# Patient Record
Sex: MALE | Race: BLACK OR AFRICAN AMERICAN | NOT HISPANIC OR LATINO | Employment: UNEMPLOYED | URBAN - METROPOLITAN AREA
[De-identification: names, ages, dates, MRNs, and addresses within clinical notes are randomized per-mention and may not be internally consistent; named-entity substitution may affect disease eponyms.]

---

## 2024-01-11 ENCOUNTER — APPOINTMENT (OUTPATIENT)
Dept: HEMATOLOGY/ONCOLOGY | Facility: HOSPITAL | Age: 64
End: 2024-01-11

## 2024-01-18 ENCOUNTER — APPOINTMENT (OUTPATIENT)
Dept: HEMATOLOGY/ONCOLOGY | Facility: HOSPITAL | Age: 64
End: 2024-01-18
Payer: MEDICAID

## 2024-01-25 ENCOUNTER — TELEPHONE (OUTPATIENT)
Dept: HEMATOLOGY/ONCOLOGY | Facility: HOSPITAL | Age: 64
End: 2024-01-25
Payer: MEDICAID

## 2024-01-25 ENCOUNTER — APPOINTMENT (OUTPATIENT)
Dept: HEMATOLOGY/ONCOLOGY | Facility: HOSPITAL | Age: 64
End: 2024-01-25
Payer: MEDICAID

## 2024-01-25 DIAGNOSIS — C61 PROSTATE CANCER (MULTI): Primary | ICD-10-CM

## 2024-01-25 NOTE — TELEPHONE ENCOUNTER
Called patient for our scheduling phone appt.   He had told our nurse partner he forgot about it/wasn't aware.   He did not get his PSA drawn.   He is agreeable to do it today or tomorrow at .   Order for PSA placed and order to get him rescheduled for a phone visit was placed.

## 2024-02-01 ENCOUNTER — TELEMEDICINE (OUTPATIENT)
Dept: HEMATOLOGY/ONCOLOGY | Facility: HOSPITAL | Age: 64
End: 2024-02-01
Payer: MEDICAID

## 2024-02-01 DIAGNOSIS — C61 PROSTATE CANCER (MULTI): ICD-10-CM

## 2024-02-05 ENCOUNTER — LAB (OUTPATIENT)
Dept: LAB | Facility: HOSPITAL | Age: 64
End: 2024-02-05
Payer: MEDICAID

## 2024-02-05 DIAGNOSIS — Z00.00 REGULAR CHECK-UP: Primary | ICD-10-CM

## 2024-02-05 DIAGNOSIS — C61 PROSTATE CANCER (MULTI): ICD-10-CM

## 2024-02-05 LAB
HOLD SPECIMEN: NORMAL
HOLD SPECIMEN: NORMAL
PSA SERPL-MCNC: 0.11 NG/ML

## 2024-02-05 PROCEDURE — 84153 ASSAY OF PSA TOTAL: CPT

## 2024-02-05 PROCEDURE — 36415 COLL VENOUS BLD VENIPUNCTURE: CPT

## 2024-02-06 NOTE — PROGRESS NOTES
"Consent:  Verbal consent was requested and obtained from patient on this date for a telehealth visit.    Patient ID: William Bush is a 63 y.o. male.    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 PSA being monitored.     Energy a bit better than it was months ago. Exercising and working. Moving around more. Still gets tired.     Appetite is better over the last 3-6 mos.     N/v \"now and then\" after taking some medications.     In chronic pain everyday in lower abdomen between groin and lower stomach. Debilitating some days. Had CT abd/pelvis in October and March 2023 and scans in years past. No etiology found. Sees internal medicine at [facility] for management.     C/o ED since prostate surgery. PCP gave him tadalafil. Sometimes it helps.       Physical Exam  No exam since this was a phone visit.   A&O with clear and fluent speech.     Performance Status:  Symptomatic; fully ambulatory    PSA trend (some done at [facility])  2/5/24: 0.11  10/4/23: 0.11  8/30/23: 0.18  6/8/23: 0.11  10/12/22: 0.05  5/2/22: 0.05       Assessment/Plan    PSA stable going back to June 2023.     Referral placed to urology for ED.     Telephone follow up visit in 3 mos with PSA prior.       Our phone visit lasted about 13 minutes.        Diagnoses and all orders for this visit:  Prostate cancer (CMS/[facility])           Trista Oropeza, APRN-CNP     "

## 2024-02-08 ENCOUNTER — TELEMEDICINE (OUTPATIENT)
Dept: HEMATOLOGY/ONCOLOGY | Facility: HOSPITAL | Age: 64
End: 2024-02-08
Payer: MEDICAID

## 2024-02-08 DIAGNOSIS — C61 PROSTATE CANCER (MULTI): Primary | ICD-10-CM

## 2024-02-08 PROCEDURE — 99442 PR PHYS/QHP TELEPHONE EVALUATION 11-20 MIN: CPT | Performed by: NURSE PRACTITIONER

## 2024-03-05 ENCOUNTER — APPOINTMENT (OUTPATIENT)
Dept: UROLOGY | Facility: CLINIC | Age: 64
End: 2024-03-05
Payer: MEDICAID

## 2024-03-26 ENCOUNTER — OFFICE VISIT (OUTPATIENT)
Dept: UROLOGY | Facility: CLINIC | Age: 64
End: 2024-03-26
Payer: MEDICAID

## 2024-03-26 VITALS — WEIGHT: 220 LBS | BODY MASS INDEX: 29.8 KG/M2 | TEMPERATURE: 96.9 F | HEIGHT: 72 IN

## 2024-03-26 DIAGNOSIS — N52.31 ERECTILE DYSFUNCTION AFTER RADICAL PROSTATECTOMY: Primary | ICD-10-CM

## 2024-03-26 DIAGNOSIS — R35.0 URINARY FREQUENCY: ICD-10-CM

## 2024-03-26 PROCEDURE — 99204 OFFICE O/P NEW MOD 45 MIN: CPT | Performed by: UROLOGY

## 2024-03-26 PROCEDURE — 1036F TOBACCO NON-USER: CPT | Performed by: UROLOGY

## 2024-03-26 PROCEDURE — 87086 URINE CULTURE/COLONY COUNT: CPT

## 2024-03-26 RX ORDER — PREGABALIN 150 MG/1
150 CAPSULE ORAL 3 TIMES DAILY
COMMUNITY
Start: 2023-10-17

## 2024-03-26 RX ORDER — SPIRONOLACTONE 25 MG/1
25 TABLET ORAL
COMMUNITY
Start: 2024-03-13 | End: 2024-09-09

## 2024-03-26 RX ORDER — TADALAFIL 20 MG/1
20 TABLET ORAL
COMMUNITY
Start: 2024-03-13

## 2024-03-26 RX ORDER — HYDROCHLOROTHIAZIDE 12.5 MG/1
TABLET ORAL
COMMUNITY
End: 2024-05-09 | Stop reason: WASHOUT

## 2024-03-26 RX ORDER — AMLODIPINE BESYLATE 10 MG/1
10 TABLET ORAL
COMMUNITY
Start: 2023-10-17

## 2024-03-26 RX ORDER — LOSARTAN POTASSIUM 100 MG/1
100 TABLET ORAL
COMMUNITY
Start: 2023-10-17 | End: 2024-04-14

## 2024-03-26 RX ORDER — CELECOXIB 200 MG/1
200 CAPSULE ORAL 2 TIMES DAILY
COMMUNITY
Start: 2024-02-16 | End: 2024-05-16

## 2024-03-26 RX ORDER — CETIRIZINE HYDROCHLORIDE 10 MG/1
10 TABLET ORAL AS NEEDED
COMMUNITY
Start: 2022-10-16

## 2024-03-26 RX ORDER — SILDENAFIL 50 MG/1
TABLET, FILM COATED ORAL
COMMUNITY
Start: 2024-03-13

## 2024-03-26 NOTE — PROGRESS NOTES
HPI:  63 y.o. yo male patient complains of  erectile dysfunction. NPV referred by Trista from heme/onc.Hx of Pre-diabetes, HTN, prostate ca sp RALP at [facility]    Duration: since 2021 (prostate ca)  Rigidity of erections:  5-6/10, doesn't last  Morning Erections: occasionally  s/p prostatectomy: yes  On nitrates/NTG?: No  Smoker: Marijuana  Partner: Partner(s)  Tried PDE5is?: Yes, currently using both  Viagra/sildenafil - response:   Cialis/tadalafil- response:   Tried penile injections: yes, has only tried twice (doesn't do well with needles)   Libido/Desire: Good  Have been on Testosterone /anabolic steroids? no  Pain or curvature in penis when erect: hx 2 hernia surgeries (pain in groin, has hx of hernia issues)  Premature or delayed ejaculation:  None    PSA  2/5/24 - 0.11  10/4/23 - 0.11  8/30/23 - 0.18  8/30/23 - 0.18  6/28/23 - 0.11  10/12/22 - 0.05  10/11/22 - <0.10    6/28/23: T 357  Lab Results   Component Value Date    HGBA1C 5.8 (H) 08/30/2023         PMH:  No past medical history on file.     PSH:  No past surgical history on file.     Medications:    Current Outpatient Medications:     amLODIPine (Norvasc) 10 mg tablet, Take 1 tablet (10 mg) by mouth once daily., Disp: , Rfl:     celecoxib (CeleBREX) 200 mg capsule, Take 1 capsule (200 mg) by mouth twice a day., Disp: , Rfl:     cetirizine (ZyrTEC) 10 mg tablet, Take 1 tablet (10 mg) by mouth once daily., Disp: , Rfl:     losartan (Cozaar) 100 mg tablet, Take 1 tablet (100 mg) by mouth once daily., Disp: , Rfl:     pregabalin (Lyrica) 150 mg capsule, Take 1 capsule (150 mg) by mouth 3 times a day., Disp: , Rfl:     sildenafil (Viagra) 50 mg tablet, TAKE ONE (1) TABLET BY MOUTH AS NEEDED., Disp: , Rfl:     spironolactone (Aldactone) 25 mg tablet, Take 1 tablet (25 mg) by mouth once daily., Disp: , Rfl:     tadalafil 20 mg tablet, Take 1 tablet (20 mg) by mouth., Disp: , Rfl:     hydroCHLOROthiazide (Microzide) 12.5 mg tablet, Take by mouth once daily., Disp:  , Rfl:     Allergy:  Allergies   Allergen Reactions    Penicillins Hives, Rash and Unknown    Oxycodone Hives     Hives     Hives    Ciprofloxacin Rash    Flurbiprofen Rash        Exam  Testicles descended bilaterally, nontender, no masses  Vasa palpable bilaterally  Penis circ'd, no lesions, no plaques    Assessment/Plan  #Erectile Dysfunction: refractory to medical management with PDE5i's and intracavernosal injection therapy    We had a long discussion regarding penile prosthesis, including risks, benefits, and alternatives. We discussed risks including but not limited to pain, bleeding, infection, damage to adjacent structures, need for further procedures, malfunction, erosion, extrusion, complications of anesthesia etc. We discussed the postoperative course and expectations, and specifically that after getting an implant, other methods such as injections etc are no longer effective. We also discussed the effect of the implant on length and that it will likely be shorter than previous given age and duration of ED. Further, if he gets an infection and the implant has to be removed, there is potential for him to never have erections again. All questions were answered and reading materials were given. The patient was given models of both the inflatable and malleable implants, and his ability to squeeze the pump was also assessed   UA/CX/PVR today    #Prostate Cancer  -on active surveillance    Fu in 1 week    Scribe Attestation  By signing my name below, I, Katherine Tran, attest that this documentation  has been prepared under the direction and in the presence of Zuleyma Kulkarni MD.

## 2024-03-28 LAB — BACTERIA UR CULT: ABNORMAL

## 2024-04-01 DIAGNOSIS — N39.0 URINARY TRACT INFECTION WITHOUT HEMATURIA, SITE UNSPECIFIED: Primary | ICD-10-CM

## 2024-04-01 RX ORDER — CEPHALEXIN 500 MG/1
500 CAPSULE ORAL 4 TIMES DAILY
Qty: 28 CAPSULE | Refills: 0 | Status: SHIPPED | OUTPATIENT
Start: 2024-04-01 | End: 2024-04-08

## 2024-04-02 ENCOUNTER — OFFICE VISIT (OUTPATIENT)
Dept: UROLOGY | Facility: CLINIC | Age: 64
End: 2024-04-02
Payer: MEDICAID

## 2024-04-02 ENCOUNTER — HOSPITAL ENCOUNTER (OUTPATIENT)
Facility: HOSPITAL | Age: 64
Setting detail: OUTPATIENT SURGERY
End: 2024-04-02
Attending: UROLOGY | Admitting: UROLOGY
Payer: MEDICAID

## 2024-04-02 VITALS — TEMPERATURE: 97.7 F | BODY MASS INDEX: 29.6 KG/M2 | WEIGHT: 218.26 LBS

## 2024-04-02 DIAGNOSIS — Z09 ENCOUNTER FOR FOLLOW-UP: ICD-10-CM

## 2024-04-02 DIAGNOSIS — N52.31 ERECTILE DYSFUNCTION AFTER RADICAL PROSTATECTOMY: Primary | ICD-10-CM

## 2024-04-02 DIAGNOSIS — N50.819 PAIN IN TESTICLE, UNSPECIFIED LATERALITY: ICD-10-CM

## 2024-04-02 PROCEDURE — G2211 COMPLEX E/M VISIT ADD ON: HCPCS | Performed by: UROLOGY

## 2024-04-02 PROCEDURE — 1036F TOBACCO NON-USER: CPT | Performed by: UROLOGY

## 2024-04-02 PROCEDURE — 99215 OFFICE O/P EST HI 40 MIN: CPT | Performed by: UROLOGY

## 2024-04-02 RX ORDER — ACETAMINOPHEN 325 MG/1
975 TABLET ORAL ONCE
OUTPATIENT
Start: 2024-04-02 | End: 2024-04-02

## 2024-04-02 RX ORDER — CHLORHEXIDINE GLUCONATE 40 MG/ML
SOLUTION TOPICAL DAILY PRN
OUTPATIENT
Start: 2024-04-02

## 2024-04-02 RX ORDER — GABAPENTIN 600 MG/1
600 TABLET ORAL ONCE
OUTPATIENT
Start: 2024-04-02 | End: 2024-04-02

## 2024-04-02 ASSESSMENT — PAIN SCALES - GENERAL: PAINLEVEL: 0-NO PAIN

## 2024-04-02 NOTE — PROGRESS NOTES
HPI:  63 y.o. yo male patient complains of  erectile dysfunction. NPV referred by Trista from heme/onc.Hx of Pre-diabetes, HTN, prostate ca sp RALP at [facility]    Last Visit 3/26/24:  -ipp counseling  -UA/CX/PVR today  -on active surveillance    Todays Visit:  #UTI  -will be taking Keflex 500mg QID   -has not picked up from pharmacy yet, will get today    #Erectile Dysfunction  -reviewed IPP counseling    -has tried and failed injections   Duration: since 2021 (prostate ca)  Rigidity of erections:  5-6/10, doesn't last  Morning Erections: occasionally  s/p prostatectomy: yes  On nitrates/NTG?: No  Smoker: Marijuana  Partner: Partner(s)  Tried PDE5is?: Yes, currently using both  Viagra/sildenafil - response:   Cialis/tadalafil- response:   Tried penile injections: yes, has only tried twice (doesn't do well with needles)   Libido/Desire: Good  Have been on Testosterone /anabolic steroids? no  Pain or curvature in penis when erect: hx 2 hernia surgeries (pain in groin, has hx of hernia issues)  Premature or delayed ejaculation:  None    3/26/24: UACX - 20,000 - 80,000 Streptococcus agalactiae (Group B Streptococcus)      PSA  2/5/24 - 0.11  10/4/23 - 0.11  8/30/23 - 0.18  8/30/23 - 0.18  6/28/23 - 0.11  10/12/22 - 0.05  10/11/22 - <0.10    6/28/23: T 357  Lab Results   Component Value Date    HGBA1C 5.8 (H) 08/30/2023     PMH:  No past medical history on file.     PSH:  No past surgical history on file.     Medications:    Current Outpatient Medications:     amLODIPine (Norvasc) 10 mg tablet, Take 1 tablet (10 mg) by mouth once daily., Disp: , Rfl:     celecoxib (CeleBREX) 200 mg capsule, Take 1 capsule (200 mg) by mouth twice a day., Disp: , Rfl:     cephalexin (Keflex) 500 mg capsule, Take 1 capsule (500 mg) by mouth 4 times a day for 7 days., Disp: 28 capsule, Rfl: 0    cetirizine (ZyrTEC) 10 mg tablet, Take 1 tablet (10 mg) by mouth once daily., Disp: , Rfl:     hydroCHLOROthiazide (Microzide) 12.5 mg tablet, Take by  mouth once daily., Disp: , Rfl:     losartan (Cozaar) 100 mg tablet, Take 1 tablet (100 mg) by mouth once daily., Disp: , Rfl:     pregabalin (Lyrica) 150 mg capsule, Take 1 capsule (150 mg) by mouth 3 times a day., Disp: , Rfl:     sildenafil (Viagra) 50 mg tablet, TAKE ONE (1) TABLET BY MOUTH AS NEEDED., Disp: , Rfl:     spironolactone (Aldactone) 25 mg tablet, Take 1 tablet (25 mg) by mouth once daily., Disp: , Rfl:     tadalafil 20 mg tablet, Take 1 tablet (20 mg) by mouth., Disp: , Rfl:     Allergy:  Allergies   Allergen Reactions    Penicillins Hives, Rash and Unknown    Oxycodone Hives     Hives     Hives    Ciprofloxacin Rash    Flurbiprofen Rash        Exam  Testicles descended bilaterally  Right testicle slightly tender to palpation   Vasa palpable bilaterally  Penis circ'd, no lesions, no plaques    Assessment/Plan  #Erectile Dysfunction after radical prostatectomy : refractory to medical management with PDE5i's and intracavernosal injection therapy    We had a long discussion regarding penile prosthesis, including risks, benefits, and alternatives. We discussed risks including but not limited to pain, bleeding, infection, damage to adjacent structures, need for further procedures, malfunction, erosion, extrusion, complications of anesthesia etc. We discussed the postoperative course and expectations, and specifically that after getting an implant, other methods such as injections etc are no longer effective. We also discussed the effect of the implant on length and that it will likely be shorter than previous given age and duration of ED. Further, if he gets an infection and the implant has to be removed, there is potential for him to never have erections again. All questions were answered and reading materials were given. The patient was given models of both the inflatable and malleable implants, and his ability to squeeze the pump was also assessed   Will proceed with inflatable penile prosthesis    Vanc / gent  Left submuscular placement     #Prostate Cancer sp RALP / Rising PSA   -on surveillance    #Testicular pain / tenderness  -scrotal US     Fu after scrotal US    G 2211  Visit complexity inherent to evaluation and management (E&M) associated with medical care services that serve as the continuing focal point for all needed health care services and/or with medical care services that are part of ongoing care related to a patient's single, serious condition or a complex condition.     Scribe Attestation  By signing my name below, I, Katherine Tran, attest that this documentation  has been prepared under the direction and in the presence of Zuleyma Kulkarni MD.

## 2024-05-09 ENCOUNTER — CLINICAL SUPPORT (OUTPATIENT)
Dept: PREADMISSION TESTING | Facility: HOSPITAL | Age: 64
End: 2024-05-09
Payer: MEDICAID

## 2024-05-09 ENCOUNTER — TELEMEDICINE (OUTPATIENT)
Dept: HEMATOLOGY/ONCOLOGY | Facility: HOSPITAL | Age: 64
End: 2024-05-09
Payer: MEDICAID

## 2024-05-09 DIAGNOSIS — N52.31 ERECTILE DYSFUNCTION AFTER RADICAL PROSTATECTOMY: ICD-10-CM

## 2024-05-09 DIAGNOSIS — C61 PROSTATE CANCER (MULTI): ICD-10-CM

## 2024-05-09 DIAGNOSIS — Z09 ENCOUNTER FOR FOLLOW-UP: ICD-10-CM

## 2024-05-09 PROCEDURE — 99212 OFFICE O/P EST SF 10 MIN: CPT | Performed by: INTERNAL MEDICINE

## 2024-05-09 PROCEDURE — 1036F TOBACCO NON-USER: CPT | Performed by: INTERNAL MEDICINE

## 2024-05-09 NOTE — PROGRESS NOTES
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

## 2024-05-16 ENCOUNTER — OFFICE VISIT (OUTPATIENT)
Dept: UROLOGY | Facility: CLINIC | Age: 64
End: 2024-05-16
Payer: MEDICAID

## 2024-05-16 DIAGNOSIS — N52.31 ERECTILE DYSFUNCTION AFTER RADICAL PROSTATECTOMY: Primary | ICD-10-CM

## 2024-05-16 LAB
POC APPEARANCE, URINE: CLEAR
POC BILIRUBIN, URINE: NEGATIVE
POC BLOOD, URINE: NEGATIVE
POC COLOR, URINE: YELLOW
POC GLUCOSE, URINE: NEGATIVE MG/DL
POC KETONES, URINE: NEGATIVE MG/DL
POC LEUKOCYTES, URINE: NEGATIVE
POC NITRITE,URINE: NEGATIVE
POC PH, URINE: 5.5 PH
POC PROTEIN, URINE: NEGATIVE MG/DL
POC SPECIFIC GRAVITY, URINE: 1.02
POC UROBILINOGEN, URINE: 0.2 EU/DL

## 2024-05-16 PROCEDURE — 99213 OFFICE O/P EST LOW 20 MIN: CPT | Performed by: NURSE PRACTITIONER

## 2024-05-16 PROCEDURE — 81003 URINALYSIS AUTO W/O SCOPE: CPT | Performed by: NURSE PRACTITIONER

## 2024-05-16 RX ORDER — SULFAMETHOXAZOLE AND TRIMETHOPRIM 800; 160 MG/1; MG/1
1 TABLET ORAL 2 TIMES DAILY
Qty: 4 TABLET | Refills: 0 | Status: SHIPPED | OUTPATIENT
Start: 2024-05-16 | End: 2024-05-18

## 2024-05-16 NOTE — PROGRESS NOTES
Urology [place]  Outpatient Clinic Note    Alisa Bush is a 63 y.o. male    History of Present Illness   Patient presenting for pre-operative visit. Pending IPP Placement with Dr. Kulkarni 05/30/2024  History of ED, HTN, Prostate Cancer s/p RALP at [facility]   Urinalysis today Negative     Past Medical History and Surgical History   Past Medical History:   Diagnosis Date    CKD (chronic kidney disease)     BUN/CR= 15/1.76 10/11/22    DM (diabetes mellitus) (Multi)     Pre-DM, A1C=5.8% on 8/30/23    ED (erectile dysfunction)     History of stress test 04/26/2021    Normal 2.There is no scintigraphic evidence for inducible ischemia.3.No evidence of scarred myocardium.4.Left ventricle is normal in size.The left ventricle systolic fxn is normal.5.Right ventricle is normal in size.The right ventricle systolic function is normal. 6.This is a low risk scan. No distinct coronary calcifications. Gated Stress FBP Gated Rest    HTN (hypertension)     Neuropathy     Genital area- on lyrica    Prostate cancer (Multi)     s/p prostate removal in 2021    Seasonal allergies      Past Surgical History:   Procedure Laterality Date    COLONOSCOPY      HERNIA REPAIR      x2    PROSTATECTOMY  2021       Medications  Current Outpatient Medications on File Prior to Visit   Medication Sig Dispense Refill    amLODIPine (Norvasc) 10 mg tablet Take 1 tablet (10 mg) by mouth once daily.      celecoxib (CeleBREX) 200 mg capsule Take 1 capsule (200 mg) by mouth twice a day.      cetirizine (ZyrTEC) 10 mg tablet Take 1 tablet (10 mg) by mouth if needed.      losartan (Cozaar) 100 mg tablet Take 1 tablet (100 mg) by mouth once daily.      pregabalin (Lyrica) 150 mg capsule Take 1 capsule (150 mg) by mouth 3 times a day.      sildenafil (Viagra) 50 mg tablet TAKE ONE (1) TABLET BY MOUTH AS NEEDED.      spironolactone (Aldactone) 25 mg tablet Take 1 tablet (25 mg) by mouth once daily.      tadalafil 20 mg tablet Take 1 tablet (20 mg)  by mouth.       No current facility-administered medications on file prior to visit.       Objective   Physicial Exam  General: Well developed, well nourished, alert and cooperative, appears in no acute distress  Eyes: Non-injected conjunctiva, sclera clear, no proptosis  Cardiac: Extremities are warm and well perfused. No edema, cyanosis or pallor.   Lungs: Breathing is easy, non-labored. Speaking in clear and complete sentences. Normal diaphragmatic movement.  MSK: Ambulatory with steady gait, unassisted  Neuro: alert and oriented to person, place and time  Psych: Demonstrates good judgement and reason, without hallucinations, abnormal affect or abnormal behaviors.  Skin: no obvious lesions, no rashes.    Review of Systems  All other systems have been reviewed and are negative for complaint.      Assessment and Plan   We reviewed all pertinent laboratory work and imaging as it pertains to patient's upcoming surgery.  We discussed the risks, benefits and alternatives to patient's upcoming surgery.  We discussed the post operative course including incision care, drain care, and catheter care. We had an opportunity to review IPP including pump, reservoir and cylinders. We reviewed use and indication of preoperative antibiotic, of which he verbalized understanding. All questions were addressed and answered appropriately.  Patient feels comfortable following this teaching visit.    Start Bactrim twice daily two days prior to Surgery     All questions and concerns were addressed. Patient verbalizes understanding and has no other questions at this time.   Maryann Hyde-- DANIEL RON  Office Phone:  [phone]

## 2024-05-17 ENCOUNTER — TELEPHONE (OUTPATIENT)
Dept: PREADMISSION TESTING | Facility: HOSPITAL | Age: 64
End: 2024-05-17
Payer: MEDICAID

## 2024-05-17 NOTE — TELEPHONE ENCOUNTER
5/17 email from surgeon:  Hi, I just spoke to the patient. He said he did show up to the PAT appt yesterday, he was told his insurance didn't cover that visit. He still has PAT's number to follow up. I'm not sure how to proceed with this patient regarding this.  Thanks, Karen      5/16 email to surgeon   No show   Dos 5/30

## 2024-05-29 ENCOUNTER — TELEPHONE (OUTPATIENT)
Dept: UROLOGY | Facility: CLINIC | Age: 64
End: 2024-05-29
Payer: MEDICAID

## 2024-05-29 NOTE — TELEPHONE ENCOUNTER
Returned call to patient, per patient he will be enrolled in [employer] starting June 1 from previous plan that did not cover IPP. Patient to provide new [employer] plan info to  Urology once received so plan can be ran to see if IPP will now be covered under new insurance plan.

## 2024-06-03 ENCOUNTER — APPOINTMENT (OUTPATIENT)
Dept: UROLOGY | Facility: HOSPITAL | Age: 64
End: 2024-06-03
Payer: MEDICAID

## 2024-06-06 DIAGNOSIS — N52.9 ERECTILE DYSFUNCTION, UNSPECIFIED ERECTILE DYSFUNCTION TYPE: ICD-10-CM

## 2024-06-19 ENCOUNTER — APPOINTMENT (OUTPATIENT)
Dept: UROLOGY | Facility: CLINIC | Age: 64
End: 2024-06-19
Payer: COMMERCIAL

## 2024-06-21 ENCOUNTER — TELEPHONE (OUTPATIENT)
Dept: UROLOGY | Facility: CLINIC | Age: 64
End: 2024-06-21
Payer: COMMERCIAL

## 2024-06-24 ENCOUNTER — DOCUMENTATION (OUTPATIENT)
Dept: PREADMISSION TESTING | Facility: HOSPITAL | Age: 64
End: 2024-06-24

## 2024-06-24 ENCOUNTER — LAB (OUTPATIENT)
Dept: LAB | Facility: LAB | Age: 64
End: 2024-06-24
Payer: COMMERCIAL

## 2024-06-24 ENCOUNTER — PRE-ADMISSION TESTING (OUTPATIENT)
Dept: PREADMISSION TESTING | Facility: HOSPITAL | Age: 64
End: 2024-06-24
Payer: COMMERCIAL

## 2024-06-24 VITALS
DIASTOLIC BLOOD PRESSURE: 72 MMHG | HEIGHT: 71 IN | RESPIRATION RATE: 16 BRPM | BODY MASS INDEX: 30.12 KG/M2 | WEIGHT: 215.17 LBS | TEMPERATURE: 97.7 F | SYSTOLIC BLOOD PRESSURE: 109 MMHG | OXYGEN SATURATION: 97 % | HEART RATE: 70 BPM

## 2024-06-24 DIAGNOSIS — N52.31 ERECTILE DYSFUNCTION AFTER RADICAL PROSTATECTOMY: ICD-10-CM

## 2024-06-24 DIAGNOSIS — Z72.51 UNPROTECTED SEXUAL INTERCOURSE: ICD-10-CM

## 2024-06-24 DIAGNOSIS — Z01.818 ENCOUNTER FOR OTHER PREPROCEDURAL EXAMINATION: Primary | ICD-10-CM

## 2024-06-24 DIAGNOSIS — N52.9 ERECTILE DYSFUNCTION, UNSPECIFIED ERECTILE DYSFUNCTION TYPE: ICD-10-CM

## 2024-06-24 DIAGNOSIS — Z01.818 PREOP TESTING: ICD-10-CM

## 2024-06-24 DIAGNOSIS — Z01.818 PREOP TESTING: Primary | ICD-10-CM

## 2024-06-24 DIAGNOSIS — C61 PROSTATE CANCER (MULTI): ICD-10-CM

## 2024-06-24 DIAGNOSIS — Z09 ENCOUNTER FOR FOLLOW-UP: ICD-10-CM

## 2024-06-24 LAB
ANION GAP SERPL CALC-SCNC: 14 MMOL/L (ref 10–20)
ATRIAL RATE: 59 BPM
BASOPHILS # BLD AUTO: 0.07 X10*3/UL (ref 0–0.1)
BASOPHILS NFR BLD AUTO: 1.2 %
BUN SERPL-MCNC: 26 MG/DL (ref 6–23)
CALCIUM SERPL-MCNC: 8.9 MG/DL (ref 8.6–10.3)
CHLORIDE SERPL-SCNC: 106 MMOL/L (ref 98–107)
CO2 SERPL-SCNC: 23 MMOL/L (ref 21–32)
CREAT SERPL-MCNC: 1.96 MG/DL (ref 0.5–1.3)
EGFRCR SERPLBLD CKD-EPI 2021: 37 ML/MIN/1.73M*2
EOSINOPHIL # BLD AUTO: 0.06 X10*3/UL (ref 0–0.7)
EOSINOPHIL NFR BLD AUTO: 1.1 %
ERYTHROCYTE [DISTWIDTH] IN BLOOD BY AUTOMATED COUNT: 14.2 % (ref 11.5–14.5)
EST. AVERAGE GLUCOSE BLD GHB EST-MCNC: 120 MG/DL
GLUCOSE SERPL-MCNC: 92 MG/DL (ref 74–99)
HBA1C MFR BLD: 5.8 %
HCT VFR BLD AUTO: 37.1 % (ref 41–52)
HGB BLD-MCNC: 12.3 G/DL (ref 13.5–17.5)
IMM GRANULOCYTES # BLD AUTO: 0.01 X10*3/UL (ref 0–0.7)
IMM GRANULOCYTES NFR BLD AUTO: 0.2 % (ref 0–0.9)
LYMPHOCYTES # BLD AUTO: 1.54 X10*3/UL (ref 1.2–4.8)
LYMPHOCYTES NFR BLD AUTO: 27.4 %
MCH RBC QN AUTO: 30.3 PG (ref 26–34)
MCHC RBC AUTO-ENTMCNC: 33.2 G/DL (ref 32–36)
MCV RBC AUTO: 91 FL (ref 80–100)
MONOCYTES # BLD AUTO: 0.71 X10*3/UL (ref 0.1–1)
MONOCYTES NFR BLD AUTO: 12.6 %
NEUTROPHILS # BLD AUTO: 3.24 X10*3/UL (ref 1.2–7.7)
NEUTROPHILS NFR BLD AUTO: 57.5 %
NRBC BLD-RTO: 0 /100 WBCS (ref 0–0)
P AXIS: 62 DEGREES
P OFFSET: 194 MS
P ONSET: 131 MS
PLATELET # BLD AUTO: 223 X10*3/UL (ref 150–450)
POTASSIUM SERPL-SCNC: 4.5 MMOL/L (ref 3.5–5.3)
PR INTERVAL: 184 MS
PSA SERPL-MCNC: 0.14 NG/ML
Q ONSET: 223 MS
QRS COUNT: 10 BEATS
QRS DURATION: 82 MS
QT INTERVAL: 380 MS
QTC CALCULATION(BAZETT): 376 MS
QTC FREDERICIA: 378 MS
R AXIS: 25 DEGREES
RBC # BLD AUTO: 4.06 X10*6/UL (ref 4.5–5.9)
SODIUM SERPL-SCNC: 138 MMOL/L (ref 136–145)
T AXIS: 30 DEGREES
T OFFSET: 413 MS
VENTRICULAR RATE: 59 BPM
WBC # BLD AUTO: 5.6 X10*3/UL (ref 4.4–11.3)

## 2024-06-24 PROCEDURE — 93005 ELECTROCARDIOGRAM TRACING: CPT | Performed by: PHYSICIAN ASSISTANT

## 2024-06-24 PROCEDURE — 87491 CHLMYD TRACH DNA AMP PROBE: CPT

## 2024-06-24 PROCEDURE — 87086 URINE CULTURE/COLONY COUNT: CPT

## 2024-06-24 PROCEDURE — 85025 COMPLETE CBC W/AUTO DIFF WBC: CPT

## 2024-06-24 PROCEDURE — 87591 N.GONORRHOEAE DNA AMP PROB: CPT

## 2024-06-24 PROCEDURE — 84153 ASSAY OF PSA TOTAL: CPT

## 2024-06-24 PROCEDURE — 87081 CULTURE SCREEN ONLY: CPT | Mod: AHULAB | Performed by: PHYSICIAN ASSISTANT

## 2024-06-24 PROCEDURE — 36415 COLL VENOUS BLD VENIPUNCTURE: CPT

## 2024-06-24 PROCEDURE — 80048 BASIC METABOLIC PNL TOTAL CA: CPT

## 2024-06-24 PROCEDURE — 83036 HEMOGLOBIN GLYCOSYLATED A1C: CPT

## 2024-06-24 RX ORDER — CHLORHEXIDINE GLUCONATE ORAL RINSE 1.2 MG/ML
SOLUTION DENTAL
Qty: 475 ML | Refills: 0 | Status: SHIPPED | OUTPATIENT
Start: 2024-06-24 | End: 2024-06-27 | Stop reason: HOSPADM

## 2024-06-24 ASSESSMENT — ENCOUNTER SYMPTOMS
NECK PAIN: 0
VISUAL CHANGE: 0
EYE DISCHARGE: 0
FEVER: 0
BRUISES/BLEEDS EASILY: 0
UNEXPECTED WEIGHT CHANGE: 0
WHEEZING: 0
SINUS CONGESTION: 0
DYSPNEA WITH EXERTION: 0
NECK STIFFNESS: 0
DIFFICULTY URINATING: 0
SHORTNESS OF BREATH: 0
EXCESSIVE BLEEDING: 0
NAUSEA: 0
DIARRHEA: 0
PALPITATIONS: 0
CONSTIPATION: 0
SKIN CHANGES: 0
DYSPNEA AT REST: 0
CONFUSION: 0
ARTHRALGIAS: 0
LIMITED RANGE OF MOTION: 0
EYE PAIN: 0
VOMITING: 0
ABDOMINAL PAIN: 1
MYALGIAS: 0
COUGH: 0
NUMBNESS: 0
WOUND: 0
WEAKNESS: 0
CHILLS: 0
RHINORRHEA: 0
DYSURIA: 0
LIGHT-HEADEDNESS: 0
BLOOD IN STOOL: 0
HEMOPTYSIS: 0
TROUBLE SWALLOWING: 0
DOUBLE VISION: 0
ABDOMINAL DISTENTION: 0

## 2024-06-24 NOTE — CPM/PAT NURSE NOTE
CPM/PAT Nurse Note      Name: William Bush (William Bush)  /Age: 1960/63 y.o.       Past Medical History:   Diagnosis Date    CKD (chronic kidney disease)     BUN/CR= 15/1.76 10/11/22    DM (diabetes mellitus) (Multi)     Pre-DM, A1C=5.8% on 23    ED (erectile dysfunction)     History of stress test 2021    Normal 2.There is no scintigraphic evidence for inducible ischemia.3.No evidence of scarred myocardium.4.Left ventricle is normal in size.The left ventricle systolic fxn is normal.5.Right ventricle is normal in size.The right ventricle systolic function is normal. 6.This is a low risk scan. No distinct coronary calcifications. Gated Stress FBP Gated Rest    HTN (hypertension)     Neuropathy     Genital area- on lyrica    Prostate cancer (Multi)     s/p prostate removal in     Seasonal allergies        Past Surgical History:   Procedure Laterality Date    COLONOSCOPY      HERNIA REPAIR      x2    PROSTATECTOMY         Patient Sexual activity questions deferred to the physician.    Family History   Problem Relation Name Age of Onset    No Known Problems Mother      Coronary artery disease Father         Allergies   Allergen Reactions    Penicillins Hives, Rash and Unknown    Oxycodone Hives     Hives     Hives    Ciprofloxacin Rash    Flurbiprofen Rash       Prior to Admission medications    Medication Sig Start Date End Date Taking? Authorizing Provider   amLODIPine (Norvasc) 10 mg tablet Take 1 tablet (10 mg) by mouth once daily. 10/17/23   Historical Provider, MD   cetirizine (ZyrTEC) 10 mg tablet Take 1 tablet (10 mg) by mouth if needed. 10/16/22   Historical Provider, MD   chlorhexidine (Peridex) 0.12 % solution Swish for 30 seconds and spit 15mL of solution the night before and morning of surgery 24   Carline Kennedy PA-C   losartan (Cozaar) 100 mg tablet Take 1 tablet (100 mg) by mouth once daily. 10/17/23 4/14/24  Historical Provider, MD   pregabalin (Lyrica) 150 mg capsule  Take 1 capsule (150 mg) by mouth 3 times a day. 10/17/23   Historical Provider, MD   sildenafil (Viagra) 50 mg tablet TAKE ONE (1) TABLET BY MOUTH AS NEEDED. 3/13/24   Historical Provider, MD   spironolactone (Aldactone) 25 mg tablet Take 1 tablet (25 mg) by mouth once daily. 3/13/24 9/9/24  Historical Provider, MD   tadalafil 20 mg tablet Take 1 tablet (20 mg) by mouth. 3/13/24   Historical Provider, MD        PAT ROS     DASI Risk Score    No data to display       Caprini DVT Assessment    No data to display       Modified Frailty Index    No data to display       CHADS2 Stroke Risk  Current as of 30 minutes ago        N/A 3 to 100%: High Risk   2 to < 3%: Medium Risk   0 to < 2%: Low Risk     Last Change: N/A          This score determines the patient's risk of having a stroke if the patient has atrial fibrillation.        This score is not applicable to this patient. Components are not calculated.          Revised Cardiac Risk Index    No data to display       Apfel Simplified Score    No data to display       Risk Analysis Index Results This Encounter    No data found in the last 1 encounters.         Nurse Plan of Action:     After Visit Summary (AVS) reviewed and patient verbalized good understanding of medications and NPO instructions.  Pre-op infection prevention measures:  CHG showers and mouthwash reviewed, understanding voiced.  CHG soap given and patient verbalized need to pick CHG mouthwash at their preferred local pharmacy.

## 2024-06-24 NOTE — CPM/PAT H&P
[facility]/PAT Evaluation       Name: William Bush (William Bush)  /Age: 1960/63 y.o.       Date of Consult: 24     Referring Provider: Dr. Kulkarni    Surgery, Date, and Length: Penile Prosthesis Insertion , 24, 120MIN    William Bush is a 63 year-old male who presents to the [facility] for perioperative risk assessment prior to surgery.    Patient presents with a primary diagnosis of ED. He has a h/o prostate cancer and is s/p prostatectomy in .  He has tried sildenafil and tadalafil without sufficient results.  He has tried injectable meds only once due to his aversion to needles.  He wishes to proceed with PPI as planned. Of note, pt mentions unprotected intercourse a few days ago.  Although he denies dysuria or urethral discharge, he wishes to have urine tested for GC/Chlamydia.  Orders have been placed.     This note was created in part upon personal review of patient's medical records.      Patient is scheduled to have Penile Prosthesis Insertion      Pt denies any past history of anesthetic complications such as PONV, awareness, prolonged sedation, dental damage, aspiration, cardiac arrest, difficult intubation, difficult I.V. access or unexpected hospital admissions.  NO malignant hyperthermia and or pseudocholinesterase deficiency.  No history of blood transfusions     The patient is not a [employer] and will accept blood and blood products if medically indicated.   Type and screen NOT sent.     Past Medical History:   Diagnosis Date    CKD (chronic kidney disease)     BUN/CR= 15/1.76 10/11/22    DM (diabetes mellitus) (Multi)     Pre-DM, A1C=5.8% on 23    ED (erectile dysfunction)     History of stress test 2021    Normal 2.There is no scintigraphic evidence for inducible ischemia.3.No evidence of scarred myocardium.4.Left ventricle is normal in size.The left ventricle systolic fxn is normal.5.Right ventricle is normal in size.The right ventricle systolic function is  normal. 6.This is a low risk scan. No distinct coronary calcifications. Gated Stress FBP Gated Rest    HTN (hypertension)     Neuropathy     Genital area- on lyrica    Prostate cancer (Multi)     s/p prostate removal in 2021    Seasonal allergies        Past Surgical History:   Procedure Laterality Date    COLONOSCOPY      HERNIA REPAIR      x2    PROSTATECTOMY  2021       Patient Sexual activity questions deferred to the physician.    Family History   Problem Relation Name Age of Onset    No Known Problems Mother      Coronary artery disease Father       Social History     Socioeconomic History    Marital status: Unknown     Spouse name: Not on file    Number of children: Not on file    Years of education: Not on file    Highest education level: Not on file   Occupational History    Not on file   Tobacco Use    Smoking status: Never    Smokeless tobacco: Never   Vaping Use    Vaping status: Never Used   Substance and Sexual Activity    Alcohol use: Yes     Alcohol/week: 2.0 standard drinks of alcohol     Types: 2 Shots of liquor per week    Drug use: Yes     Types: Marijuana     Comment: few times a day    Sexual activity: Defer   Other Topics Concern    Not on file   Social History Narrative    Not on file     Social Determinants of Health     Financial Resource Strain: Not at Risk (6/1/2022)    Received from [facility]     Financial Resource Strain     Financial Resource Strain: 1   Food Insecurity: Unknown (10/3/2023)    Received from [facility]    Hunger Vital Sign     Worried About Running Out of Food in the Last Year: Never true     Ran Out of Food in the Last Year: Not on file   Transportation Needs: Not at Risk (6/1/2022)    Received from [facility]     Transportation Needs     Transportation: 1   Physical Activity: Not on File (6/1/2022)    Received from [facility]     Physical Activity     Physical Activity: 0   Stress: Not at Risk (6/1/2022)    Received from [facility]     Stress     Stress: 1   Social Connections:  Declined (2022)    Received from [employer]     Social Connections and Isolation: 99   Intimate Partner Violence: Not on file   Housing Stability: Not at Risk (2022)    Received from [employer]     [facility] [place] Stability     Housin        Allergies   Allergen Reactions    Penicillins Hives, Rash and Unknown    Oxycodone Hives     Hives     Hives    Ciprofloxacin Rash    Flurbiprofen Rash       Current Outpatient Medications:     amLODIPine (Norvasc) 10 mg tablet, Take 1 tablet (10 mg) by mouth once daily., Disp: , Rfl:     pregabalin (Lyrica) 150 mg capsule, Take 1 capsule (150 mg) by mouth 3 times a day., Disp: , Rfl:     sildenafil (Viagra) 50 mg tablet, TAKE ONE (1) TABLET BY MOUTH AS NEEDED., Disp: , Rfl:     spironolactone (Aldactone) 25 mg tablet, Take 1 tablet (25 mg) by mouth once daily., Disp: , Rfl:     tadalafil 20 mg tablet, Take 1 tablet (20 mg) by mouth., Disp: , Rfl:     cetirizine (ZyrTEC) 10 mg tablet, Take 1 tablet (10 mg) by mouth if needed., Disp: , Rfl:     chlorhexidine (Peridex) 0.12 % solution, Swish for 30 seconds and spit 15mL of solution the night before and morning of surgery, Disp: 475 mL, Rfl: 0    losartan (Cozaar) 100 mg tablet, Take 1 tablet (100 mg) by mouth once daily., Disp: , Rfl:          PAT ROS:   Constitutional:    no fever   no chills   no unexpected weight change  Neuro/Psych:    no numbness   no weakness   no light-headedness   no confusion  Eyes:    no discharge   no pain   no vision loss   no diplopia   no visual disturbance   use of corrective lenses  Ears:    no ear pain   no hearing loss   no tinnitus  Nose:    no nasal discharge   no sinus congestion   no epistaxis  Mouth:    no dental issues   no mouth pain   no oral bleeding   no mouth lesions  Throat:    no throat pain   no dysphagia  Neck:    no neck pain   no neck stiffness  Cardio:    Functional 4 Mets. 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;  cooking, cleaning, grocery shopping   no chest pain   no palpitations   no peripheral edema   no dyspnea   no YEE  Respiratory:    no cough   no wheezing   no hemoptysis   no shortness of breath  Endocrine:    no cold intolerance   no heat intolerance  GI:    no abdominal distention   abdominal pain (RLQ)   no constipation   no diarrhea   no nausea   no vomiting   no blood in stool  :    no difficulty urinating   no dysuria   no oliguria  Musculoskeletal:    no arthralgias   no myalgias   no decreased ROM  Hematologic:    does not bruise/bleed easily   no excessive bleeding   no history of blood transfusion   no blood clots  Skin:   no skin changes   no sores/wound   no rash      Physical Exam  Constitutional:       General: He is not in acute distress.     Appearance: Normal appearance. He is not ill-appearing, toxic-appearing or diaphoretic.   HENT:      Head: Normocephalic and atraumatic.      Nose: Nose normal. No rhinorrhea.      Mouth/Throat:      Pharynx: No oropharyngeal exudate.   Eyes:      Extraocular Movements: Extraocular movements intact.      Conjunctiva/sclera: Conjunctivae normal.   Cardiovascular:      Rate and Rhythm: Normal rate and regular rhythm.      Heart sounds: No murmur heard.     No friction rub. No gallop.      Comments: Functional 4 Mets. Patient denies SOB walking up 2 flights of stairs     Pulmonary:      Effort: Pulmonary effort is normal. No respiratory distress.      Breath sounds: Normal breath sounds. No stridor. No wheezing or rhonchi.   Abdominal:      General: Bowel sounds are normal. There is no distension.      Palpations: Abdomen is soft. There is no mass.      Tenderness: There is abdominal tenderness (RLQ). There is no guarding or rebound.      Hernia: No hernia is present.   Musculoskeletal:         General: No swelling, tenderness, deformity or signs of injury. Normal range of motion.      Cervical back: Normal range of motion and neck supple. No rigidity or  "tenderness.   Skin:     General: Skin is warm and dry.      Coloration: Skin is not jaundiced or pale.      Findings: No bruising, erythema, lesion or rash.   Neurological:      General: No focal deficit present.      Mental Status: He is alert and oriented to person, place, and time.      Cranial Nerves: No cranial nerve deficit.      Sensory: No sensory deficit.      Motor: No weakness.      Coordination: Coordination normal.   Psychiatric:         Mood and Affect: Mood normal.         Behavior: Behavior normal.          PAT AIRWAY:   Airway:     Mallampati::  III    Neck ROM::  Full   No broken teeth, no dentures and no missing teeth          Visit Vitals  /72   Pulse 70   Temp 36.5 °C (97.7 °F)   Resp 16   Ht 1.803 m (5' 11\")   Wt 97.6 kg (215 lb 2.7 oz)   SpO2 97%   BMI 30.01 kg/m²   Smoking Status Never   BSA 2.21 m²      LABS:  Lab Results   Component Value Date    WBC 5.6 06/24/2024    HGB 12.3 (L) 06/24/2024    HCT 37.1 (L) 06/24/2024    MCV 91 06/24/2024     06/24/2024      Lab Results   Component Value Date    GLUCOSE 92 06/24/2024    CALCIUM 8.9 06/24/2024     06/24/2024    K 4.5 06/24/2024    CO2 23 06/24/2024     06/24/2024    BUN 26 (H) 06/24/2024    CREATININE 1.96 (H) 06/24/2024       EKG 6/24/24  Sinus bradycardia with sinus arrhythmia  Otherwise normal EKG   Vent rate = 59bpm    Stress test 4/26/21  CONCLUSIONS:    1. SPECT Perfusion Study: Normal.    2. There is no scintigraphic evidence for inducible ischemia.    3. No evidence of scarred myocardium.    4. Left ventricle is normal in size. The left ventricle systolic   function is normal.    5. Right ventricle is normal in size. The right ventricle systolic   function is normal.    6. This is a low risk scan.    7. Incidental Findings from limited non-diagnostic CTAC:       - No distinct coronary calcifications.             Gated Stress FBP Gated Rest FBP    LVEF % 72               69     A1CF 5.8  Assessment and Plan: "     Patient is a 63-year-old male scheduled for a Penile Prosthesis Insertion with Dr. Kulkarni on 6/27/24.    Patient has no active cardiac symptoms.   Patient denies any chest pain, tightness, heaviness, pressure, radiating pain, palpitations, irregular heartbeats, lightheadedness, cough, congestion, shortness of breath, YEE, PND, near syncope, weight loss or gain.     RCRI  0  , 3.9 % Risk of MACE    Cardiac:  HTN - cont amlodipine on dos; Losartan - HOLD evening prior to surgery and morning of surgery      Encouraged lifestyle modifications, low-sodium diet, and increase activity as tolerated.  Monitor BP and follow up with managing physician for readings sustaining >140/90.    Endocrine:  DMII - pt not currently on any meds for this issue  HgbA1c 8/30/23 = 5.8%    Renal:  CKD  10/3/23 crt 1.95; GFR 38  6/24/24 Crt 1.96; GFR 37    Recommendations to avoid nephrotoxic drugs and carefully monitor fluid status to maintain euvolemia. Use dose adjusted medications as needed for the underlying level of renal function.     Hematology:  Anemia  6/24/24 H/H 12.3/37.1%    Patient instructed to ambulate as soon as possible postoperatively to decrease thromboembolic risk.   Initiate mechanical DVT prophylaxis as soon as possible and initiate chemical prophylaxis when deemed safe from a bleeding standpoint post surgery.     :  Unprotected intercourse - pt admits to unprotected sex a few days ago.  He denies urethral discharge or dysuria, but has requested urine be tested for GC/Chlamydia.  Orders placed.     LABS: CBC, BMP, Ucx, A1c, GC/Chlamydia, EKG and MRSA ordered. Blood work results reviewed and show stable CKD and anemia.     Followup: MRSA, A1c, GC/Chlamydia pending    STOP BANG: male, >50, HTN = 3    Caprini: 7    Risk assessment complete.  Patient is scheduled for a intermediate surgical risk procedure.        Preoperative medication instructions were provided and reviewed with the patient.  Any additional  testing or evaluation was explained to the patient.  Nothing by mouth instructions were discussed and patient's questions were answered prior to conclusion to this encounter.  Patient verbalized understanding of preoperative instructions given in preadmission testing; discharge instructions available in EMR.    This note was dictated by a speech recognition.  Minor errors may have been detected in a speech recognition.

## 2024-06-24 NOTE — PREPROCEDURE INSTRUCTIONS
Medication List            Accurate as of June 24, 2024  8:28 AM. Always use your most recent med list.                amLODIPine 10 mg tablet  Commonly known as: Norvasc  Medication Adjustments for Surgery: Take morning of surgery with sip of water, no other fluids     cetirizine 10 mg tablet  Commonly known as: ZyrTEC  Medication Adjustments for Surgery: Stop 1 day before surgery     chlorhexidine 0.12 % solution  Commonly known as: Peridex  Swish for 30 seconds and spit 15mL of solution the night before and morning of surgery     losartan 100 mg tablet  Commonly known as: Cozaar  Notes to patient: HOLD evening prior to surgery and morning of surgery       pregabalin 150 mg capsule  Commonly known as: Lyrica  Medication Adjustments for Surgery: Take morning of surgery with sip of water, no other fluids     sildenafil 50 mg tablet  Commonly known as: Viagra  Medication Adjustments for Surgery: Stop 3 days before surgery     spironolactone 25 mg tablet  Commonly known as: Aldactone  Medication Adjustments for Surgery: Take morning of surgery with sip of water, no other fluids     tadalafil 20 mg tablet  Commonly known as: Cialis  Medication Adjustments for Surgery: Stop 3 days before surgery                            **Concerning above medication instructions, if medication is normally taken at night, continue normal schedule.**  **DO NOT TAKE NIGHT PRIOR AND MORNING OF SURGERY**    CONTACT SURGEON'S OFFICE IF YOU DEVELOP:  * Fever = 100.4 F   * New respiratory symptoms (e.g. cough, shortness of breath, respiratory distress, sore throat)  * Recent loss of taste or smell  *Flu like symptoms such as headache, fatigue or gastrointestinal symptoms  * You develop any open sores, shingles, burning or painful urination   AND/OR:  * You no longer wish to have the surgery.  * Any other personal circumstances change that may lead to the need to cancel or defer this surgery.  *You were admitted to any hospital within one  week of your planned procedure.    SMOKING:  *Quitting smoking can make a huge difference to your health and recovery from surgery.    *If you need help with quitting, call [phone]-NOW.    THE DAY BEFORE SURGERY:   Nothing by mouth (no solids or liquids) after midnight.   If diabetic, please check fasting blood sugar upon waking up.    If fasting sugar is <80 mg/dl, please drink 100ml/3oz of apple juice no later than 2 hours prior to arrival time.      SURGICAL TIME  *You will be contacted between 2 p.m. and 6 p.m. the business day before your surgery with your arrival time.  *If you haven't received a call by 6pm, call [phone].  *Scheduled surgery times may change and you will be notified if this occurs-check your personal voicemail for any updates.    ON THE MORNING OF SURGERY:  *Wear comfortable, loose fitting clothing.   *Do not use moisturizers, creams, lotions or perfume.  *All jewelry and valuables should be left at home.  *Prosthetic devices such as contact lenses, hearing aids, dentures, eyelash extensions, hairpins and body piercing must be removed before surgery.    BRING WITH YOU:  *Photo ID and insurance card  *Current list of medicines and allergies  *Pacemaker/Defibrillator/Heart stent cards  *CPAP machine and mask  *Slings/splints/crutches  *Copy of your complete Advanced Directive/DHPOA-if applicable  *Neurostimulator implant remote    PARKING AND ARRIVAL:  *Check in at the Main Entrance desk and let them know you are here for surgery.  *You will be directed to the 2nd floor surgical waiting area.    AFTER OUTPATIENT SURGERY:  *A responsible adult MUST accompany you at the time of discharge and stay with you for 24 hours after your surgery.  *You may NOT drive yourself home after surgery.  *You may use a taxi or ride sharing service ([email or web address], Uber) to return home ONLY if you are accompanied by a friend or family member.  *Instructions for resuming your medications will be provided by your  surgeon.          Patient Information: Oral/Dental Rinse  **This is a prescription; pick it up at your preferred local pharmacy **  What is oral/dental rinse?   It is a mouthwash. It is a way of cleaning the mouth with a germ killing solution before your surgery.  The solution contains chlorhexidine, commonly known as CHG.   It is used inside the mouth to kill a bacteria known as Staphylococcus aureus.  Let your doctor know if you are allergic to Chlorhexidine.    Why do I need to use CHG oral/dental rinse?  The CHG oral/dental rinse helps to kill a bacteria in your mouth known a Staphylococcus aureus.     This reduces the risk of infection at the surgical site.      Using your CHG oral/dental rinse  STEPS:  Use your CHG oral/dental rinse after you brush your teeth the night before (at bedtime) and the morning of your surgery.  Follow all directions on your prescription label.    Use the cap on the container to measure 15ml (fill cap to fill line)  Swish (gargle if you can) the mouthwash in your mouth for at least 30 seconds, (do not to swallow) spit out  After you use your CHG rinse, do not rinse your mouth with water, drink or eat.  Please refer to prescription label for the appropriate time to resume oral intake  Dental rinse comes in one size bottle: 473ml ~16oz.  You will have leftover    rinse, discard after this use.    What side effects might I have using the CHG oral/dental rinse?  CHG rinse will stick to plaque on the teeth.  Brush and floss just before use.  Teeth brushing will help avoid staining of plaque during use.    Who should I contact if I have questions about the CHG oral/dental rinse?  Please call [facility], Preadmission Testing at [phone] if you have any questions      Home Preoperative Antibacterial Shower     What is a home preoperative antibacterial shower?  This shower is a way of cleaning the skin with a germ killing soap before surgery.  The soap  contains chlorhexidine, commonly known as CHG.  CHG is a soap for your skin with germ killing ability.  Let your doctor know if you are allergic to chlorhexidine.    Why do I need to take a preoperative antibacterial shower?  Skin is not sterile.  It is best to try to make your skin as free of germs as possible before surgery.  Proper cleansing with a germ killing soap before surgery can lower the number of germs on your skin.  This helps to reduce the risk of infection at the surgical site.  Following the instructions listed below will help you prepare your skin for surgery.      How do I use the CHG skin cleanser?  Steps:  Begin using your CHG soap five days before your scheduled surgery on ________________________.    Days 1-4 Shower before bed:  Wash your face and genitals with your normal soap and rinse.    Wash and rinse your hair using the CHG soap. Rinse completely, do not condition your   Hair.          3.    Apply the CHG soap to a clean wet washcloth.  Turn the water off or move away                From the water spray to avoid premature rinsing of the CHG soap as you are applying.     4.   Lather your entire body from the neck down.  Do not use on your face or genitals.   Pay special attention to the area(s) where your incision(s) will be located unless they are on your face.  Avoid scrubbing your skin too hard.  The important point is to have the CHG soap sit on your skin for 3 minutes.    When the 3 minutes are up, turn on the water and rinse the CHG soap off your body completely.   Pat yourself dry with a clean, freshly-laundered towel.  Dress in clean, freshly laundered night clothes.    Be sure to sleep with clean, freshly laundered sheets.  Day 5:  Last shower is the morning before surgery: Follow above Instructions.    NOTE:    *Hair extensions should be removed    *Keep CHG soap out of eyes and ear canals   *DO NOT wash with regular soap on your body after you have used the CHG        soap  solution  *DO NOT apply powders, lotions, or perfume.  *Deodorant may be used days 1-4, BUT NOT the day of surgery    Who should I contact if I have any questions regarding the use of CHG soap?  Call the [facility], Preadmission Testing at [phone] if you have any questions.              Patient Information: Pre-Operative Infection Prevention Measures     Why did I have my nose, under my arms and groin swabbed?  The purpose of the swab is to identify Staphylococcus aureus inside your nose or on your skin.  The swab was sent to the laboratory for culture.  A positive swab/culture for Staphylococcus aureus is called colonization or carriage.      What is Staphylococcus aureus?  Staphylococcus aureus, also known as “staph”, is a germ found on the skin or in the nose of healthy people.  Sometimes Staphylococcus aureus can get into the body and cause an infection.  This can be minor (such as pimples, boils or other skin problems).  It might also be serious (such as blood infection, pneumonia or a surgical site infection).    What is Staphylococcus aureus colonization or carriage?  Colonization or carriage means that a person has the germ but is not sick from it.  These bacteria can be spread on the hands or when breathing or sneezing.    How is Staphylococcus aureus spread?  It is most often spread by close contact with a person or item that carries it.    What happens if my culture is positive for Staphylococcus aureus?  Your doctor/medical team will use this information to guide any antibiotic treatment which may be necessary.  Regardless of the culture results, we will clean the inside of your nose with a betadine swab just before you have your surgery.      Will I get an infection if I have Staphylococcus aureus in my nose or on my skin?  Anyone can get an infection with Staphylococcus aureus.  However, the best way to reduce your risk of infection is to follow the instructions  provided to you for the use of your CHG soap and dental rinse.        Who should I contact if I have any questions?  Call the [facility], Preadmission Testing at [phone] if you have any questions.

## 2024-06-25 LAB
C TRACH RRNA SPEC QL NAA+PROBE: NEGATIVE
N GONORRHOEA DNA SPEC QL PROBE+SIG AMP: NEGATIVE

## 2024-06-26 ENCOUNTER — ANESTHESIA EVENT (OUTPATIENT)
Dept: OPERATING ROOM | Facility: HOSPITAL | Age: 64
End: 2024-06-26
Payer: COMMERCIAL

## 2024-06-26 LAB — BACTERIA UR CULT: NORMAL

## 2024-06-26 NOTE — ANESTHESIA PREPROCEDURE EVALUATION
Patient: William Bush    Procedure Information       Date/Time: 06/27/24 0730    Procedure: Penile Prosthesis Insertion (Penis)    Location: [facility] OR 01 / Virtual [facility] OR    Surgeons: Zuleyma Kulkarni MD            Relevant Problems   No relevant active problems       Clinical information reviewed:   Tobacco  Allergies  Meds   Med Hx  Surg Hx   Fam Hx  Soc Hx         Past Medical History:   Diagnosis Date    CKD (chronic kidney disease)     BUN/CR= 15/1.76 10/11/22    ED (erectile dysfunction)     History of stress test 04/26/2021    Normal 2.There is no scintigraphic evidence for inducible ischemia.3.No evidence of scarred myocardium.4.Left ventricle is normal in size.The left ventricle systolic fxn is normal.5.Right ventricle is normal in size.The right ventricle systolic function is normal. 6.This is a low risk scan. No distinct coronary calcifications. Gated Stress FBP Gated Rest    HTN (hypertension)     Neuropathy     Genital area- on lyrica    Prediabetes     Prostate cancer (Multi)     s/p prostate removal in 2021    Seasonal allergies       Past Surgical History:   Procedure Laterality Date    COLONOSCOPY      HERNIA REPAIR Right 04/12/2023    Lap inguinal    HERNIA REPAIR Left 07/16/2021    Inguinal    PROSTATE BIOPSY      PROSTATECTOMY  04/26/2021    Robotic     Social History     Tobacco Use    Smoking status: Never    Smokeless tobacco: Never   Vaping Use    Vaping status: Never Used   Substance Use Topics    Alcohol use: Yes     Alcohol/week: 2.0 standard drinks of alcohol     Types: 2 Shots of liquor per week    Drug use: Yes     Types: Marijuana     Comment: few times a day      Current Outpatient Medications   Medication Instructions    amLODIPine (NORVASC) 10 mg, oral, Daily RT    cetirizine (ZYRTEC) 10 mg, oral, As needed    chlorhexidine (Peridex) 0.12 % solution Swish for 30 seconds and spit 15mL of solution the night before and morning of surgery    losartan (COZAAR) 100 mg, oral,  "Daily RT    polyethylene glycol (GLYCOLAX, MIRALAX) 17 g, oral, Daily    pregabalin (LYRICA) 150 mg, oral, 3 times daily    sildenafil (Viagra) 50 mg tablet TAKE ONE (1) TABLET BY MOUTH AS NEEDED.    spironolactone (ALDACTONE) 25 mg, oral, Daily RT    sulfamethoxazole-trimethoprim (Bactrim DS) 800-160 mg tablet 1 tablet, oral, 2 times daily    tadalafil (CIALIS) 20 mg, oral    traMADol (ULTRAM) 50 mg, oral, Every 6 hours PRN      Allergies   Allergen Reactions    Penicillins Hives, Rash and Unknown    Oxycodone Hives     Hives     Hives    Ciprofloxacin Rash    Flurbiprofen Rash        Chemistry    Lab Results   Component Value Date/Time     06/24/2024 0913    K 4.5 06/24/2024 0913     06/24/2024 0913    CO2 23 06/24/2024 0913    BUN 26 (H) 06/24/2024 0913    CREATININE 1.96 (H) 06/24/2024 0913    Lab Results   Component Value Date/Time    CALCIUM 8.9 06/24/2024 0913    ALKPHOS 45 10/11/2022 1555    AST 16 10/11/2022 1555    ALT 15 10/11/2022 1555    BILITOT 0.5 10/11/2022 1555          Lab Results   Component Value Date    HGBA1C 5.8 (H) 06/24/2024     Lab Results   Component Value Date/Time    WBC 5.6 06/24/2024 0913    HGB 12.3 (L) 06/24/2024 0913    HCT 37.1 (L) 06/24/2024 0913     06/24/2024 0913     No results found for: \"PROTIME\", \"PTT\", \"INR\"  No results found for: \"ABORH\"  Encounter Date: 06/24/24   ECG 12 lead (Clinic Performed)   Result Value    Ventricular Rate 59    Atrial Rate 59    MT Interval 184    QRS Duration 82    QT Interval 380    QTC Calculation(Bazett) 376    P Axis 62    R Axis 25    T Axis 30    QRS Count 10    Q Onset 223    P Onset 131    P Offset 194    T Offset 413    QTC Fredericia 378    Narrative    Sinus bradycardia with sinus arrhythmia  Otherwise normal ECG  When compared with ECG of 23-OCT-1999 02:27,  No significant change was found  Confirmed by Thal, Manuel ([id number]) on 6/24/2024 8:56:10 AM     No results found for this or any previous visit from the past 1095 " days.     Nuclear stress 4/26/2021@[facility]:  CONCLUSIONS:    1. SPECT Perfusion Study: Normal.    2. There is no scintigraphic evidence for inducible ischemia.    3. No evidence of scarred myocardium.    4. Left ventricle is normal in size. The left ventricle systolic   function is normal.    5. Right ventricle is normal in size. The right ventricle systolic   function is normal.    6. This is a low risk scan.    7. Incidental Findings from limited non-diagnostic CTAC:       - No distinct coronary calcifications.             Gated Stress FBP Gated Rest FBP    LVEF % 72               69     Visit Vitals  /66   Pulse 59   Temp 36 °C (96.8 °F) (Temporal)   Resp 18   Ht 1.829 m (6')   Wt 96.4 kg (212 lb 8.4 oz)   SpO2 100%   BMI 28.82 kg/m²   Smoking Status Never   BSA 2.21 m²     NPO/Void Status  Carbohydrate Drink Given Prior to Surgery? : N  Date of Last Liquid: 06/27/24  Time of Last Liquid: 0600  Date of Last Solid: 06/26/24  Time of Last Solid: 2200  Last Intake Type: Clear fluids  Time of Last Void: 0657         Physical Exam    Airway  Mallampati: III  TM distance: >3 FB  Neck ROM: full     Cardiovascular   Rhythm: regular  Rate: normal     Dental - normal exam     Pulmonary   Breath sounds clear to auscultation     Abdominal - normal exam       Other findings: 2023: easy mask, mayer 3, grade 1           Anesthesia Plan    History of general anesthesia?: yes  History of complications of general anesthesia?: no    ASA 3     general   (General with LMA)  intravenous induction   Postoperative administration of opioids is intended.  Trial extubation is planned.  Anesthetic plan and risks discussed with patient.    Plan discussed with CRNA and CAA.

## 2024-06-27 ENCOUNTER — HOSPITAL ENCOUNTER (OUTPATIENT)
Facility: HOSPITAL | Age: 64
Setting detail: OUTPATIENT SURGERY
Discharge: HOME | End: 2024-06-27
Attending: UROLOGY | Admitting: UROLOGY
Payer: COMMERCIAL

## 2024-06-27 ENCOUNTER — ANESTHESIA (OUTPATIENT)
Dept: OPERATING ROOM | Facility: HOSPITAL | Age: 64
End: 2024-06-27
Payer: COMMERCIAL

## 2024-06-27 VITALS
BODY MASS INDEX: 28.79 KG/M2 | SYSTOLIC BLOOD PRESSURE: 144 MMHG | RESPIRATION RATE: 18 BRPM | OXYGEN SATURATION: 96 % | HEART RATE: 65 BPM | TEMPERATURE: 97.2 F | DIASTOLIC BLOOD PRESSURE: 94 MMHG | HEIGHT: 72 IN | WEIGHT: 212.52 LBS

## 2024-06-27 DIAGNOSIS — N52.31 ERECTILE DYSFUNCTION AFTER RADICAL PROSTATECTOMY: Primary | ICD-10-CM

## 2024-06-27 LAB — STAPHYLOCOCCUS SPEC CULT: NORMAL

## 2024-06-27 PROCEDURE — 2500000004 HC RX 250 GENERAL PHARMACY W/ HCPCS (ALT 636 FOR OP/ED): Performed by: ANESTHESIOLOGY

## 2024-06-27 PROCEDURE — 54235 NJX CORPORA CAVERNOSA RX AGT: CPT | Performed by: UROLOGY

## 2024-06-27 PROCEDURE — 2500000005 HC RX 250 GENERAL PHARMACY W/O HCPCS: Performed by: ANESTHESIOLOGIST ASSISTANT

## 2024-06-27 PROCEDURE — 2500000001 HC RX 250 WO HCPCS SELF ADMINISTERED DRUGS (ALT 637 FOR MEDICARE OP): Performed by: STUDENT IN AN ORGANIZED HEALTH CARE EDUCATION/TRAINING PROGRAM

## 2024-06-27 PROCEDURE — 7100000002 HC RECOVERY ROOM TIME - EACH INCREMENTAL 1 MINUTE: Performed by: UROLOGY

## 2024-06-27 PROCEDURE — 2500000004 HC RX 250 GENERAL PHARMACY W/ HCPCS (ALT 636 FOR OP/ED): Performed by: PHARMACIST

## 2024-06-27 PROCEDURE — C1813 PROSTHESIS, PENILE, INFLATAB: HCPCS | Performed by: UROLOGY

## 2024-06-27 PROCEDURE — 2500000005 HC RX 250 GENERAL PHARMACY W/O HCPCS: Performed by: UROLOGY

## 2024-06-27 PROCEDURE — 2500000004 HC RX 250 GENERAL PHARMACY W/ HCPCS (ALT 636 FOR OP/ED): Performed by: ANESTHESIOLOGIST ASSISTANT

## 2024-06-27 PROCEDURE — 2720000007 HC OR 272 NO HCPCS: Performed by: UROLOGY

## 2024-06-27 PROCEDURE — L7900 MALE VACUUM ERECTION SYSTEM: HCPCS | Performed by: UROLOGY

## 2024-06-27 PROCEDURE — 2780000003 HC OR 278 NO HCPCS: Performed by: UROLOGY

## 2024-06-27 PROCEDURE — 3600000003 HC OR TIME - INITIAL BASE CHARGE - PROCEDURE LEVEL THREE: Performed by: UROLOGY

## 2024-06-27 PROCEDURE — 7100000001 HC RECOVERY ROOM TIME - INITIAL BASE CHARGE: Performed by: UROLOGY

## 2024-06-27 PROCEDURE — 3700000001 HC GENERAL ANESTHESIA TIME - INITIAL BASE CHARGE: Performed by: UROLOGY

## 2024-06-27 PROCEDURE — C1713 ANCHOR/SCREW BN/BN,TIS/BN: HCPCS | Performed by: UROLOGY

## 2024-06-27 PROCEDURE — 2500000001 HC RX 250 WO HCPCS SELF ADMINISTERED DRUGS (ALT 637 FOR MEDICARE OP): Performed by: ANESTHESIOLOGY

## 2024-06-27 PROCEDURE — 3700000002 HC GENERAL ANESTHESIA TIME - EACH INCREMENTAL 1 MINUTE: Performed by: UROLOGY

## 2024-06-27 PROCEDURE — 3600000008 HC OR TIME - EACH INCREMENTAL 1 MINUTE - PROCEDURE LEVEL THREE: Performed by: UROLOGY

## 2024-06-27 PROCEDURE — 7100000009 HC PHASE TWO TIME - INITIAL BASE CHARGE: Performed by: UROLOGY

## 2024-06-27 PROCEDURE — 2500000004 HC RX 250 GENERAL PHARMACY W/ HCPCS (ALT 636 FOR OP/ED): Performed by: UROLOGY

## 2024-06-27 PROCEDURE — 54405 INSERT MULTI-COMP PENIS PROS: CPT | Performed by: UROLOGY

## 2024-06-27 PROCEDURE — 7100000010 HC PHASE TWO TIME - EACH INCREMENTAL 1 MINUTE: Performed by: UROLOGY

## 2024-06-27 RX ORDER — CEFAZOLIN 1 G/1
INJECTION, POWDER, FOR SOLUTION INTRAVENOUS AS NEEDED
Status: DISCONTINUED | OUTPATIENT
Start: 2024-06-27 | End: 2024-06-27

## 2024-06-27 RX ORDER — NORETHINDRONE AND ETHINYL ESTRADIOL 0.5-0.035
KIT ORAL AS NEEDED
Status: DISCONTINUED | OUTPATIENT
Start: 2024-06-27 | End: 2024-06-27

## 2024-06-27 RX ORDER — PROPOFOL 10 MG/ML
INJECTION, EMULSION INTRAVENOUS AS NEEDED
Status: DISCONTINUED | OUTPATIENT
Start: 2024-06-27 | End: 2024-06-27

## 2024-06-27 RX ORDER — TRAMADOL HYDROCHLORIDE 50 MG/1
50 TABLET ORAL EVERY 6 HOURS PRN
Qty: 15 TABLET | Refills: 0 | Status: SHIPPED | OUTPATIENT
Start: 2024-06-27 | End: 2024-07-02

## 2024-06-27 RX ORDER — MIDAZOLAM HYDROCHLORIDE 1 MG/ML
INJECTION INTRAMUSCULAR; INTRAVENOUS AS NEEDED
Status: DISCONTINUED | OUTPATIENT
Start: 2024-06-27 | End: 2024-06-27

## 2024-06-27 RX ORDER — PHENYLEPHRINE HCL IN 0.9% NACL 1 MG/10 ML
SYRINGE (ML) INTRAVENOUS AS NEEDED
Status: DISCONTINUED | OUTPATIENT
Start: 2024-06-27 | End: 2024-06-27

## 2024-06-27 RX ORDER — SULFAMETHOXAZOLE AND TRIMETHOPRIM 800; 160 MG/1; MG/1
1 TABLET ORAL 2 TIMES DAILY
Qty: 14 TABLET | Refills: 0 | Status: SHIPPED | OUTPATIENT
Start: 2024-06-27 | End: 2024-07-04

## 2024-06-27 RX ORDER — LIDOCAINE HYDROCHLORIDE 20 MG/ML
INJECTION, SOLUTION INFILTRATION; PERINEURAL AS NEEDED
Status: DISCONTINUED | OUTPATIENT
Start: 2024-06-27 | End: 2024-06-27

## 2024-06-27 RX ORDER — ACETAMINOPHEN 325 MG/1
975 TABLET ORAL ONCE
Status: COMPLETED | OUTPATIENT
Start: 2024-06-27 | End: 2024-06-27

## 2024-06-27 RX ORDER — SODIUM CHLORIDE, SODIUM LACTATE, POTASSIUM CHLORIDE, CALCIUM CHLORIDE 600; 310; 30; 20 MG/100ML; MG/100ML; MG/100ML; MG/100ML
INJECTION, SOLUTION INTRAVENOUS CONTINUOUS PRN
Status: DISCONTINUED | OUTPATIENT
Start: 2024-06-27 | End: 2024-06-27

## 2024-06-27 RX ORDER — FENTANYL CITRATE 50 UG/ML
INJECTION, SOLUTION INTRAMUSCULAR; INTRAVENOUS AS NEEDED
Status: DISCONTINUED | OUTPATIENT
Start: 2024-06-27 | End: 2024-06-27

## 2024-06-27 RX ORDER — TRAMADOL HYDROCHLORIDE 50 MG/1
50 TABLET ORAL ONCE
Status: COMPLETED | OUTPATIENT
Start: 2024-06-27 | End: 2024-06-27

## 2024-06-27 RX ORDER — SODIUM CHLORIDE, SODIUM LACTATE, POTASSIUM CHLORIDE, CALCIUM CHLORIDE 600; 310; 30; 20 MG/100ML; MG/100ML; MG/100ML; MG/100ML
100 INJECTION, SOLUTION INTRAVENOUS CONTINUOUS
Status: DISCONTINUED | OUTPATIENT
Start: 2024-06-27 | End: 2024-06-27 | Stop reason: HOSPADM

## 2024-06-27 RX ORDER — ONDANSETRON HYDROCHLORIDE 2 MG/ML
INJECTION, SOLUTION INTRAVENOUS AS NEEDED
Status: DISCONTINUED | OUTPATIENT
Start: 2024-06-27 | End: 2024-06-27

## 2024-06-27 RX ORDER — ALBUTEROL SULFATE 0.83 MG/ML
2.5 SOLUTION RESPIRATORY (INHALATION) ONCE AS NEEDED
Status: DISCONTINUED | OUTPATIENT
Start: 2024-06-27 | End: 2024-06-27 | Stop reason: HOSPADM

## 2024-06-27 RX ORDER — GABAPENTIN 300 MG/1
600 CAPSULE ORAL ONCE
Status: COMPLETED | OUTPATIENT
Start: 2024-06-27 | End: 2024-06-27

## 2024-06-27 RX ORDER — ONDANSETRON HYDROCHLORIDE 2 MG/ML
4 INJECTION, SOLUTION INTRAVENOUS ONCE AS NEEDED
Status: DISCONTINUED | OUTPATIENT
Start: 2024-06-27 | End: 2024-06-27 | Stop reason: HOSPADM

## 2024-06-27 RX ORDER — ACETAMINOPHEN 325 MG/1
650 TABLET ORAL EVERY 4 HOURS PRN
Status: DISCONTINUED | OUTPATIENT
Start: 2024-06-27 | End: 2024-06-27 | Stop reason: HOSPADM

## 2024-06-27 RX ORDER — POLYETHYLENE GLYCOL 3350 17 G/17G
17 POWDER, FOR SOLUTION ORAL DAILY
Qty: 30 PACKET | Refills: 0 | Status: SHIPPED | OUTPATIENT
Start: 2024-06-27 | End: 2024-07-27

## 2024-06-27 RX ORDER — VANCOMYCIN HYDROCHLORIDE 1 G/200ML
1000 INJECTION, SOLUTION INTRAVENOUS ONCE
Status: DISCONTINUED | OUTPATIENT
Start: 2024-06-27 | End: 2024-06-27

## 2024-06-27 RX ADMIN — TRAMADOL HYDROCHLORIDE 50 MG: 50 TABLET, COATED ORAL at 11:46

## 2024-06-27 RX ADMIN — VANCOMYCIN HYDROCHLORIDE 1500 MG: 1.5 INJECTION, POWDER, LYOPHILIZED, FOR SOLUTION INTRAVENOUS at 07:29

## 2024-06-27 RX ADMIN — HYDROMORPHONE HYDROCHLORIDE 0.5 MG: 1 INJECTION, SOLUTION INTRAMUSCULAR; INTRAVENOUS; SUBCUTANEOUS at 11:10

## 2024-06-27 RX ADMIN — GABAPENTIN 600 MG: 300 CAPSULE ORAL at 07:19

## 2024-06-27 RX ADMIN — ACETAMINOPHEN 975 MG: 325 TABLET ORAL at 07:20

## 2024-06-27 RX ADMIN — HYDROMORPHONE HYDROCHLORIDE 0.5 MG: 1 INJECTION, SOLUTION INTRAMUSCULAR; INTRAVENOUS; SUBCUTANEOUS at 10:48

## 2024-06-27 ASSESSMENT — COLUMBIA-SUICIDE SEVERITY RATING SCALE - C-SSRS
2. HAVE YOU ACTUALLY HAD ANY THOUGHTS OF KILLING YOURSELF?: NO
6. HAVE YOU EVER DONE ANYTHING, STARTED TO DO ANYTHING, OR PREPARED TO DO ANYTHING TO END YOUR LIFE?: NO
1. IN THE PAST MONTH, HAVE YOU WISHED YOU WERE DEAD OR WISHED YOU COULD GO TO SLEEP AND NOT WAKE UP?: NO

## 2024-06-27 ASSESSMENT — PAIN - FUNCTIONAL ASSESSMENT
PAIN_FUNCTIONAL_ASSESSMENT: WONG-BAKER FACES
PAIN_FUNCTIONAL_ASSESSMENT: 0-10
PAIN_FUNCTIONAL_ASSESSMENT: UNABLE TO SELF-REPORT
PAIN_FUNCTIONAL_ASSESSMENT: 0-10
PAIN_FUNCTIONAL_ASSESSMENT: 0-10
PAIN_FUNCTIONAL_ASSESSMENT: WONG-BAKER FACES
PAIN_FUNCTIONAL_ASSESSMENT: 0-10
PAIN_FUNCTIONAL_ASSESSMENT: WONG-BAKER FACES

## 2024-06-27 ASSESSMENT — PAIN DESCRIPTION - LOCATION
LOCATION: GROIN

## 2024-06-27 ASSESSMENT — PAIN SCALES - GENERAL
PAINLEVEL_OUTOF10: 5 - MODERATE PAIN
PAINLEVEL_OUTOF10: 9
PAINLEVEL_OUTOF10: 2
PAINLEVEL_OUTOF10: 5 - MODERATE PAIN
PAINLEVEL_OUTOF10: 2
PAINLEVEL_OUTOF10: 2
PAINLEVEL_OUTOF10: 0 - NO PAIN
PAINLEVEL_OUTOF10: 5 - MODERATE PAIN
PAINLEVEL_OUTOF10: 0 - NO PAIN
PAINLEVEL_OUTOF10: 8
PAINLEVEL_OUTOF10: 5 - MODERATE PAIN
PAINLEVEL_OUTOF10: 0 - NO PAIN
PAINLEVEL_OUTOF10: 1

## 2024-06-27 ASSESSMENT — PAIN DESCRIPTION - ORIENTATION
ORIENTATION: MID

## 2024-06-27 NOTE — OP NOTE
Penile Prosthesis Insertion Operative Note     Date: 2024  OR Location: [facility]    Name: William Bush, : 1960, Age: 63 y.o., MRN: [id number], Sex: male    Diagnosis  Pre-op Diagnosis     * Erectile dysfunction after radical prostatectomy [N52.31] Post-op Diagnosis     * Erectile dysfunction after radical prostatectomy [N52.31]     Procedures  -Placement of 3-piece Coloplast inflatable penile prosthesis  -MODIFIER 22 (complex placement of penile prosthesis due to patient's prior groin and abdominal surgeries necessitating procedure time longer than normal, and a second counterincision)  -Intracavernosal injection of lidocaine/saline for artificial erection  -Penile block and bilateral pudendal nerve block    Surgeons      * Zuleyma Kulkarni - Primary    Resident/Fellow/Other Assistant:  Surgeons and Role:     * Marino Glez MD - Resident - Assisting    Procedure Summary  Anesthesia: General  ASA: III  Anesthesia Staff: Anesthesiologist: Allen Fish MD  CRNA: ARIADNE Geronimo-CRNA  C-AA: ALVARO Andrews  Estimated Blood Loss: 20 mL  Intra-op Medications:   Administrations occurring from 0730 to 0930 on 24:   Medication Name Total Dose   BUPivacaine HCl (Marcaine) 0.5 % (5 mg/mL) 29 mL, dexAMETHasone (Decadron) 4 mg syringe 30 mL   lidocaine (Xylocaine) 20 mL in sodium chloride 0.9% 100 mL syringe 47 mL              Anesthesia Record               Intraprocedure I/O Totals       None           Specimen: No specimens collected     Staff:   Circulator: Marta  Scrub Person: Yue Davison Circulator: Ino Davison Scrub: Rochelle         Drains and/or Catheters:   Closed/Suction Drain 1 Right;Inferior Abdomen Bulb 10 Fr. (Active)   Dressing Status Dry;Clean 24 1031   Drainage Appearance Bloody 24 1031   Status To bulb suction 24 1031       [REMOVED] Urethral Catheter Double-lumen;Non-latex 16 Fr. (Removed)       Tourniquet Times:         Implants:  Implants       Type  Name Action Serial No.       ASSEMBLY KIT  Implanted       TITAN REGULAR BASE REAR TIP EXTENDER KIT Implanted SCR              Findings: No urethral injury, bilateral corpora easily dilated. Difficult to identify left external inguinal ring. Reservoir placed in left subrectus space.     Indications: William Bush is an 63 y.o. male who is having surgery for Erectile dysfunction after radical prostatectomy [N52.31].       Procedure Details:    The patient was met in preoperative area and consented for the procedure. Preoperative antibiotics were started in the preoperative area. The patient was taken to the OR and placed on table in supine position on the OR table.  Anesthesia was administered without incident.  Genitalia was then shaved. A penile and pudendal block was then performed bilaterally. The skin was cleaned with chlorhexidine and then ChloraPrep was used after which he was draped in a sterile fashion. A Lone Star retractor was then set up.  A transverse penoscrotal incision was then made using the knife and dissected down through dartos down to the corporal bodies using a combination of electrocautery and sharp dissection.  The midline septum was divided to get proximal exposure of the corpora. Two 2-0 Vicryl stay sutures were placed in each corporal body, 1 medially and 1 laterally.  The corpora was then injected with 60cc of saline/lidocaine after which a 1.5 cm corporotomy was then made between the stay stitches first on the left side and thenon the right.  We then dilated the corpora using size 11 Conn dilators in both the proximal and distal direction.  Using the Katy device, we then measured our corpora on the left side, it was 12 cm proximal and 13 distal for a total of 25 cm. On the right side, it was 23 cm proximal and 13 cm distal for a total of 25 cm.  The wound was then copiously irrigated with antibiotic irrigation. The Coloplast Titan 24 cm with 1 cm rear-tip extenders as well as 125 cc  "concealed reservoir was then prepped for implantation.     We then turned our attention to development of the space for the reservoir.  We attempted to  identify the external ring on the left side for a subrectus  space but notably, patient reported having had \"groin surgeries in the past\" and as such, was difficult to find. We decided to make a counter incision superolateral to the pubic symphysis to develop the left subrectus space, but the anatomy here was tenuous as muscle and fascia in this area was rather distorted. The fascia was tagged with 2-0 vicryl holding sutures, but we decided to abort placing a reservoir through the counter incision and instead developed a subrectus space through our penoscrotal incision on the left side. The space was dilated gently with a ScoreStreak dilator. The Giovanny was then placed in the left external ring, and we were able to bluntly dissect the space further and placed the 125 ml reservoir and filled this with 110 ml sterile saline.     We then turned our attention to placement of the cylinders.  Once they had been prepped using Sid needle and Katy device, the cylinders were deployed within the corporal bodies and it seated quite nicely.  A surrogate reservoir test was then performed. No evidence of any crossover perforation or any other pathology.  It seated quite nicely.  Implant was then deflated. Our pre-placed corporotomy stitches were then tied down with nice closure of the corpora.  A subdartos pouch was made along the midline.  The pump was placed in a dependent position.  The neck of the opening was then closed using 2-0 Vicryl.  The tubing from the reservoir was connected to the pump using the quick connector set.  The deeper layers were closed with 2-0 Vicryl. A 10Fr round TEOFILO drain was placed through the right groin. The scrotal skin was closed with 4-0 Monocryl in a simple running fashion. The counterincision was also closed in multiple layers. First the fascial " holding sutures were tied down. We then closed the deeper ángela's layer with 2-0 vicryl and then closed the superficial dartos layer with 3-0 vicryl. Skin was closed with 4-0 monocryl and subcuticular running fashion. Skin glue was then applied. Escobar catheter was removed.  Penis was mummy wrapped. Wrapped fluffs and scrotal support were then applied.  The patient was awakened and transferred to PACU in stable condition.        Complications:  None; patient tolerated the procedure well.    Disposition: PACU - hemodynamically stable.  Condition: stable     Attending Attestation: I was present and scrubbed for the entire procedure.    Zuleyma Kulkarni  Phone Number: [phone]       161

## 2024-06-27 NOTE — H&P
I have reviewed the patient's History and Physical Examination. I have personally seen and evaluated the patient, repeating key portions. There is no significant interval change.     Surgery is still indicated. Yes    Consent reviewed and signed by patient/family: Yes    Operative site verified and marked: Yes  N/A    Marino Glez MD, KIERRA  Urology, PGY4  Team Pager: [phone]

## 2024-06-27 NOTE — DISCHARGE INSTRUCTIONS
Urology [place]    DISCHARGE INSTRUCTIONS     Penile Prosthesis    William Bush    Call Urology Department [phone] during regular daytime business hours (8:00 am - 5:00 pm) and after 5:00 pm ask for the Urology resident with any questions or concerns.      If it is a life-threatening situation, proceed to the nearest emergency department.          Thank you for the opportunity to care for you today.  Your health and healing are very important to us.  We hope we made you feel as comfortable as possible and are committed to your recovery and continued well-being.      The following is a brief overview of your penile prosthesis procedure. Some of the information contained on this summary may be confidential.  This information should be kept in your records and should be shared with your regular doctor.    Physicians:   Dr. Kulkarni     Procedure performed: insertion of penile prosthesis    What to Expect During your Recovery and Home Care  Anesthesia Side Effects   You received anesthesia today.  You may feel sleepy, tired, or have a sore throat.   You may also feel drowsiness, dizziness, or inability to think clearly.  For your safety, do not drive, drink alcoholic beverages, take any unprescribed medication or make any important decisions for 24 hours.  A responsible adult should be with you for 24 hours.       Activity and Recovery    Go home and rest. No strenuous activity and no heavy lifting over 10 lbs.     Pain Control  Unfortunately, you may experience pain after your procedure.  Adequate management can include alternative measures to help ease your pain and can include ice packs, scrotal support, and over the counter Tylenol. Tramadol may also be prescribe for breakthrough pain. Do not take more than 4,000 mg of Tylenol in a 24-hour period.     Tramadol is a narcotic medication, which can impair judgment, slow reaction times, and cause constipation. Take Miralax, Colace, or other stool softeners  for constipation. Do not drive or operate machinery while taking narcotic medications.    Nausea/Vomiting   Clear liquids are best tolerated at first. Start slow, advance your diet as tolerated to normal foods. Avoid spicy, greasy, heavy foods at first. Also, you may feel nauseous or like you need to vomit if you take any type of medication on an empty stomach.  Call your physician if you are unable to eat or drink and have persistent vomiting.          Signs of Bleeding   Minor bleeding or drainage may occur from the surgical site; however, excessive or consistent bleeding should be reported to your surgeon. Excessive bleeding is defined as blood that is dripping from wound, soaking you bandages, and is ketchup colored, thick with possible blood clots.  Consistent is defined as bleeding that does not stop.    Treatment/wound care:   Keep area(s) clean and dry. You can wear gauze dressing on top of the incisions, so the scrotal support does not irritate the incisions. It is incredibly important that you keep the scrotum well supported for the first two weeks after the surgery. Continue to wear the scrotal support/jock straps/tight fitting underwear.  If you have a drain in place, we will schedule an appointment for the drain to be removed.  The device is purposefully left partially inflated (approximately 30 to 40 percent). This will be deflated at the two week visit.  Pull the scrotal pump straight down towards the ground in the shower when the scrotal skin is supple. You should also do this every time you go to the bathroom This will allow the pump to heal in a nice dependent position.  It is okay to shower 48 hours after time of surgery.  Do not submerge incisions in water (tub bath, swimming) until incisions have completely healed.  Do not scrub wound(s), pat dry.  Clean with mild soap. Do not use oils or lotions on incisions.    Please visually inspect your wound(s) at least once daily.  If the wound(s) are in  a difficult to see location, please use a mirror or have someone else assist with visual inspection.    Signs of Infection  Signs of infection can include fever, excessive swelling, heat, drainage, redness, or severe pain.  If you see any of these occur, please contact [phone]. Any fever higher than 100.4, especially if associated with an ill feeling, abdominal pain, chills, or nausea should be reported to your surgeon.        Additional Instructions:   No sex until discussed further at your follow up appointment. Your device has been left partially inflated, do not deflate, or inflate further. You should wear tight fitting underwear for the next six weeks. At your six week follow up you will be taught how to use your device.

## 2024-06-27 NOTE — PERIOPERATIVE NURSING NOTE
1230 Pt ambulated to bathroom and urinated 100ml of pink tinged urine.    1245 Discharge instructions provided to pt and family. Educated on medications, effects of anesthesia, and homecoming care. Pt and family verbalizing understanding of all instructions provided at this time. All questions and concerns answered. Pt given contact information for provider.     1300 Pt assisted with dressing with help of family. IV removed dressing applied.

## 2024-06-27 NOTE — BRIEF OP NOTE
Date: 2024  OR Location: [facility] OR    Name: William Bush, : 1960, Age: 63 y.o., MRN: [id number], Sex: male    Diagnosis  Pre-op Diagnosis     * Erectile dysfunction after radical prostatectomy [N52.31] Post-op Diagnosis     * Erectile dysfunction after radical prostatectomy [N52.31]     Procedures  Penile Prosthesis Insertion  45601 - IN INSJ MULTI-COMPONENT INFLATABLE PENILE PROSTH    IN NJX C/P/A CAVERNOSA W/PHARMACOLOGIC AGT [61572]  Surgeons      * Zuleyma Kulkarni - Primary    Resident/Fellow/Other Assistant:  Surgeons and Role:     * Marino Glez MD - Resident - Assisting    Procedure Summary  Anesthesia: General  ASA: III  Anesthesia Staff: Anesthesiologist: Allen Fish MD  CRNA: ARIADNE Geronimo-CRNA  C-AA: ALVARO Andrews  Estimated Blood Loss: 20mL  Intra-op Medications:   Administrations occurring from 0730 to 0930 on 24:   Medication Name Total Dose   BUPivacaine HCl (Marcaine) 0.5 % (5 mg/mL) 29 mL, dexAMETHasone (Decadron) 4 mg syringe 30 mL   lidocaine (Xylocaine) 20 mL in sodium chloride 0.9% 100 mL syringe 47 mL              Anesthesia Record               Intraprocedure I/O Totals       None           Specimen: No specimens collected     Staff:   Circulator: Marta Quezada Person: Yue Davison Circulator: Ino Davison Scrub: Rochelle          Findings: See op note    Complications:  None; patient tolerated the procedure well.     Disposition: PACU - hemodynamically stable.  Condition: stable  Attending Attestation: I was present and scrubbed for the entire procedure.    Zuleyma Kulkarni  Phone Number: [phone]

## 2024-06-27 NOTE — ANESTHESIA POSTPROCEDURE EVALUATION
Patient: William Bush    Procedure Summary       Date: 06/27/24 Room / Location: [facility] OR 01 / Virtual [facility] OR    Anesthesia Start: 0745 Anesthesia Stop: 1038    Procedure: Penile Prosthesis Insertion (Penis) Diagnosis:       Erectile dysfunction after radical prostatectomy      (Erectile dysfunction after radical prostatectomy [N52.31])    Surgeons: Zuleyma Kulkarni MD Responsible Provider: Allen Fish MD    Anesthesia Type: general ASA Status: 3            Anesthesia Type: general    Vitals Value Taken Time   /94 06/27/24 1229   Temp 36.2 °C (97.2 °F) 06/27/24 1229   Pulse 65 06/27/24 1229   Resp 18 06/27/24 1229   SpO2 96 % 06/27/24 1229       Anesthesia Post Evaluation    Patient location during evaluation: PACU  Patient participation: complete - patient participated  Level of consciousness: awake and alert  Pain management: adequate  Airway patency: patent  Cardiovascular status: acceptable and hemodynamically stable  Respiratory status: acceptable, spontaneous ventilation and nonlabored ventilation  Hydration status: acceptable  Postoperative Nausea and Vomiting: none        There were no known notable events for this encounter.

## 2024-06-27 NOTE — ANESTHESIA PROCEDURE NOTES
Airway  Date/Time: 6/27/2024 7:54 AM  Urgency: elective    Airway not difficult    Staffing  Performed: CAA   Authorized by: Allen Fish MD    Performed by: ALVARO Andrews  Patient location during procedure: OR    Indications and Patient Condition  Indications for airway management: anesthesia  Spontaneous ventilation: present  Sedation level: moderate (conscious sedation)  Preoxygenated: yes  Mask difficulty assessment: 0 - not attempted  Planned trial extubation    Final Airway Details  Final airway type: supraglottic airway      Successful airway: Size 5     Number of attempts at approach: 1

## 2024-07-01 ENCOUNTER — OFFICE VISIT (OUTPATIENT)
Dept: UROLOGY | Facility: HOSPITAL | Age: 64
End: 2024-07-01
Payer: MEDICAID

## 2024-07-01 DIAGNOSIS — N52.31 ERECTILE DYSFUNCTION AFTER RADICAL PROSTATECTOMY: Primary | ICD-10-CM

## 2024-07-01 PROCEDURE — 99024 POSTOP FOLLOW-UP VISIT: CPT | Performed by: NURSE PRACTITIONER

## 2024-07-01 ASSESSMENT — PAIN SCALES - GENERAL: PAINLEVEL_OUTOF10: 2

## 2024-07-01 NOTE — PROGRESS NOTES
[facility]  Outpatient Clinic Note    Subjective   William Bush is a 63 y.o. male    History of Present Illness   Patient presenting to clinic today for drain removal s/p IPP with Dr. Kulkarni 6/27/2024  History of ED, HTN, Prostate Cancer s/p RALP at [facility]. He reports he has been doing well since surgery. He is eating and drinking, as normal. Urine has remained clear, yellow. Bowels have returned to normal. No drainage from incision site. He is ambulating at baseline. No fevers or chills.    Past Medical History and Surgical History   Past Medical History:   Diagnosis Date    CKD (chronic kidney disease)     BUN/CR= 15/1.76 10/11/22    ED (erectile dysfunction)     History of stress test 04/26/2021    Normal 2.There is no scintigraphic evidence for inducible ischemia.3.No evidence of scarred myocardium.4.Left ventricle is normal in size.The left ventricle systolic fxn is normal.5.Right ventricle is normal in size.The right ventricle systolic function is normal. 6.This is a low risk scan. No distinct coronary calcifications. Gated Stress FBP Gated Rest    HTN (hypertension)     Neuropathy     Genital area- on lyrica    Prediabetes     Prostate cancer (Multi)     s/p prostate removal in 2021    Seasonal allergies      Past Surgical History:   Procedure Laterality Date    COLONOSCOPY      HERNIA REPAIR Right 04/12/2023    Lap inguinal    HERNIA REPAIR Left 07/16/2021    Inguinal    PROSTATE BIOPSY      PROSTATECTOMY  04/26/2021    Robotic       Medications  Current Outpatient Medications on File Prior to Visit   Medication Sig Dispense Refill    amLODIPine (Norvasc) 10 mg tablet Take 1 tablet (10 mg) by mouth once daily.      cetirizine (ZyrTEC) 10 mg tablet Take 1 tablet (10 mg) by mouth if needed.      losartan (Cozaar) 100 mg tablet Take 1 tablet (100 mg) by mouth once daily.      polyethylene glycol (Glycolax, Miralax) 17 gram packet Take 17 g by mouth once daily. 30 packet 0    pregabalin (Lyrica) 150  mg capsule Take 1 capsule (150 mg) by mouth 3 times a day.      spironolactone (Aldactone) 25 mg tablet Take 1 tablet (25 mg) by mouth once daily.      sulfamethoxazole-trimethoprim (Bactrim DS) 800-160 mg tablet Take 1 tablet by mouth 2 times a day for 7 days. 14 tablet 0    traMADol (Ultram) 50 mg tablet Take 1 tablet (50 mg) by mouth every 6 hours if needed for severe pain (7 - 10) for up to 5 days. 15 tablet 0    [DISCONTINUED] chlorhexidine (Peridex) 0.12 % solution Swish for 30 seconds and spit 15mL of solution the night before and morning of surgery 475 mL 0    [DISCONTINUED] sildenafil (Viagra) 50 mg tablet TAKE ONE (1) TABLET BY MOUTH AS NEEDED.      [DISCONTINUED] tadalafil 20 mg tablet Take 1 tablet (20 mg) by mouth.       No current facility-administered medications on file prior to visit.       Objective   Physicial Exam  General: Well developed, well nourished, alert and cooperative, appears in no acute distress  Eyes: Non-injected conjunctiva, sclera clear, no proptosis  Cardiac: Extremities are warm and well perfused. No edema, cyanosis or pallor.   Lungs: Breathing is easy, non-labored. Speaking in clear and complete sentences. Normal diaphragmatic movement.  MSK: Ambulatory with steady gait, unassisted  Neuro: alert and oriented to person, place and time  Psych: Demonstrates good judgement and reason, without hallucinations, abnormal affect or abnormal behaviors.  Skin: no obvious lesions, no rashes.    Review of Systems  All other systems have been reviewed and are negative for complaint.      Assessment and Plan   Drain removed and patient tolerated well. He was educated on postoperative incision care. He was educated on activity restrictions. He will follow-up with Dr. Kulkarni at previously scheduled appointment. He will call the office with any new or concerning symptoms.    All questions and concerns were addressed. Patient verbalizes understanding and has no other questions at this  time. If you have any questions about your care, do not hesitate to call and leave a message, we return calls in a timely manner.    Maryann Hyde-- DANIEL RON  Office Phone:  [phone]

## 2024-07-03 ENCOUNTER — TELEPHONE (OUTPATIENT)
Dept: UROLOGY | Facility: CLINIC | Age: 64
End: 2024-07-03
Payer: MEDICAID

## 2024-07-03 NOTE — TELEPHONE ENCOUNTER
Pt left VM on nurse line reporting pain after IPP surgery last week with Dr. Kulkarni. Requesting more pain meds. Returned call to patient, left VM with nurse line number and left detailed VM to notify nursing staff of increased pain, s/s of infections such as fevers/ chills.

## 2024-07-05 ENCOUNTER — TELEPHONE (OUTPATIENT)
Dept: UROLOGY | Facility: CLINIC | Age: 64
End: 2024-07-05
Payer: MEDICAID

## 2024-07-05 DIAGNOSIS — N52.31 ERECTILE DYSFUNCTION AFTER RADICAL PROSTATECTOMY: ICD-10-CM

## 2024-07-05 RX ORDER — TRAMADOL HYDROCHLORIDE 50 MG/1
50 TABLET ORAL EVERY 6 HOURS PRN
Qty: 15 TABLET | Refills: 0 | Status: CANCELLED | OUTPATIENT
Start: 2024-07-05 | End: 2024-07-10

## 2024-07-05 NOTE — TELEPHONE ENCOUNTER
Pt left VM on nurse line reporting pain and out of prn pain medications. Call returned, patient reporting increase in pain, denying s/s of infection. Dr. Kulkarni notified, received new order for PRN tramadol, pt notified and voices understanding.

## 2024-07-08 ENCOUNTER — TELEPHONE (OUTPATIENT)
Dept: UROLOGY | Facility: CLINIC | Age: 64
End: 2024-07-08
Payer: COMMERCIAL

## 2024-07-08 NOTE — TELEPHONE ENCOUNTER
Patient left  on nurse line reporting issues with pain med prescription from Friday, he was not able to pick it up. Dr. Wolf is covering for ordering provider (Dr. Kulkarni). Per Dr. Wolf patient needs evaluated if still having post op pain from procedure on 6/27. Notified patient, and patient available to come into clinic to be evaluated. Office to be in touch with availability for patient to be seen by provider. Patient voices understanding. Patient also notified if pain becomes out of control/unmanageable or develops s/s of infection he is advised to head to the ED.

## 2024-07-09 ENCOUNTER — TELEPHONE (OUTPATIENT)
Dept: UROLOGY | Facility: CLINIC | Age: 64
End: 2024-07-09
Payer: COMMERCIAL

## 2024-07-09 ENCOUNTER — OFFICE VISIT (OUTPATIENT)
Dept: UROLOGY | Facility: HOSPITAL | Age: 64
End: 2024-07-09
Payer: MEDICAID

## 2024-07-09 DIAGNOSIS — N52.31 ERECTILE DYSFUNCTION AFTER RADICAL PROSTATECTOMY: Primary | ICD-10-CM

## 2024-07-09 PROCEDURE — 1036F TOBACCO NON-USER: CPT | Performed by: NURSE PRACTITIONER

## 2024-07-09 PROCEDURE — 1125F AMNT PAIN NOTED PAIN PRSNT: CPT | Performed by: NURSE PRACTITIONER

## 2024-07-09 PROCEDURE — 99211 OFF/OP EST MAY X REQ PHY/QHP: CPT | Performed by: NURSE PRACTITIONER

## 2024-07-09 ASSESSMENT — PAIN SCALES - GENERAL: PAINLEVEL: 7

## 2024-07-09 NOTE — TELEPHONE ENCOUNTER
Called pt to notify that BIN Wolf can see patient at 12pm today for increased pain s/p IPP. Patient voices understanding.

## 2024-07-09 NOTE — PROGRESS NOTES
[facility] [place]  Outpatient Clinic Note    Subjective   William Bush is a 64 y.o. male    History of Present Illness   Patient presenting to clinic today for FUV s/p IPP with Dr. Kulkarni 6/27/2024  History of ED, HTN, Prostate Cancer s/p RALP at [facility]. He reports he has been doing well since surgery. He is eating and drinking, as normal. Urine has remained clear, yellow. Bowels have returned to normal. No drainage from incision site. He is ambulating at baseline. No fevers or chills. No swelling or tenderness. He is requesting   A refill for pain medication.     Past Medical History and Surgical History   Past Medical History:   Diagnosis Date    CKD (chronic kidney disease)     BUN/CR= 15/1.76 10/11/22    ED (erectile dysfunction)     History of stress test 04/26/2021    Normal 2.There is no scintigraphic evidence for inducible ischemia.3.No evidence of scarred myocardium.4.Left ventricle is normal in size.The left ventricle systolic fxn is normal.5.Right ventricle is normal in size.The right ventricle systolic function is normal. 6.This is a low risk scan. No distinct coronary calcifications. Gated Stress FBP Gated Rest    HTN (hypertension)     Neuropathy     Genital area- on lyrica    Prediabetes     Prostate cancer (Multi)     s/p prostate removal in 2021    Seasonal allergies      Past Surgical History:   Procedure Laterality Date    COLONOSCOPY      HERNIA REPAIR Right 04/12/2023    Lap inguinal    HERNIA REPAIR Left 07/16/2021    Inguinal    PROSTATE BIOPSY      PROSTATECTOMY  04/26/2021    Robotic       Medications  Current Outpatient Medications on File Prior to Visit   Medication Sig Dispense Refill    amLODIPine (Norvasc) 10 mg tablet Take 1 tablet (10 mg) by mouth once daily.      cetirizine (ZyrTEC) 10 mg tablet Take 1 tablet (10 mg) by mouth if needed.      losartan (Cozaar) 100 mg tablet Take 1 tablet (100 mg) by mouth once daily.      polyethylene glycol (Glycolax, Miralax) 17 gram packet  Take 17 g by mouth once daily. 30 packet 0    pregabalin (Lyrica) 150 mg capsule Take 1 capsule (150 mg) by mouth 3 times a day.      spironolactone (Aldactone) 25 mg tablet Take 1 tablet (25 mg) by mouth once daily.      [] sulfamethoxazole-trimethoprim (Bactrim DS) 800-160 mg tablet Take 1 tablet by mouth 2 times a day for 7 days. 14 tablet 0    [] traMADol (Ultram) 50 mg tablet Take 1 tablet (50 mg) by mouth every 6 hours if needed for severe pain (7 - 10) for up to 5 days. 15 tablet 0     No current facility-administered medications on file prior to visit.       Objective   Physicial Exam  General: Well developed, well nourished, alert and cooperative, appears in no acute distress  Eyes: Non-injected conjunctiva, sclera clear, no proptosis  Cardiac: Extremities are warm and well perfused. No edema, cyanosis or pallor.   Lungs: Breathing is easy, non-labored. Speaking in clear and complete sentences. Normal diaphragmatic movement.  MSK: Ambulatory with steady gait, unassisted  Neuro: alert and oriented to person, place and time  Psych: Demonstrates good judgement and reason, without hallucinations, abnormal affect or abnormal behaviors.  Skin: no obvious lesions, no rashes.    Review of Systems  All other systems have been reviewed and are negative for complaint.    Assessment and Plan   Patient presenting to clinic today for FUV s/p IPP with Dr. Kulkarni 2024  History of ED, HTN, Prostate Cancer s/p RALP at [facility]. He reports he has been doing well since surgery. He is eating and drinking, as normal. Urine has remained clear, yellow. Bowels have returned to normal. No drainage from incision site. He is ambulating at baseline. No fevers or chills. No swelling or tenderness.     He is requesting refill for pain medication. We discussed conservative measures including use of Tylenol, ice, elevating, and good scrotal support.     He will follow-up with Dr. Kulkarni 2024.  He will  call the office with any new or concerning symptoms.    All questions and concerns were addressed. Patient verbalizes understanding and has no other questions at this time. If you have any questions about your care, do not hesitate to call and leave a message, we return calls in a timely manner.    Maryann Hyde-- DANIEL RON  Office Phone:  [phone]

## 2024-07-17 ENCOUNTER — APPOINTMENT (OUTPATIENT)
Dept: UROLOGY | Facility: CLINIC | Age: 64
End: 2024-07-17
Payer: COMMERCIAL

## 2024-07-17 VITALS — WEIGHT: 206.6 LBS | BODY MASS INDEX: 27.98 KG/M2 | HEIGHT: 72 IN | TEMPERATURE: 95 F

## 2024-07-17 DIAGNOSIS — N52.31 ERECTILE DYSFUNCTION AFTER RADICAL PROSTATECTOMY: Primary | ICD-10-CM

## 2024-07-17 PROCEDURE — 1125F AMNT PAIN NOTED PAIN PRSNT: CPT | Performed by: UROLOGY

## 2024-07-17 PROCEDURE — 99024 POSTOP FOLLOW-UP VISIT: CPT | Performed by: UROLOGY

## 2024-07-17 PROCEDURE — 1159F MED LIST DOCD IN RCRD: CPT | Performed by: UROLOGY

## 2024-07-17 PROCEDURE — 1036F TOBACCO NON-USER: CPT | Performed by: UROLOGY

## 2024-07-17 PROCEDURE — 3008F BODY MASS INDEX DOCD: CPT | Performed by: UROLOGY

## 2024-07-17 ASSESSMENT — PAIN SCALES - GENERAL: PAINLEVEL: 5

## 2024-07-17 NOTE — PROGRESS NOTES
HPI:  65 yo male patient complains of  erectile dysfunction. Hx of Pre-diabetes, HTN, prostate ca sp RALP at [facility]     Last visit 4/2/24:  - Will proceed with inflatable penile prosthesis   - Vanc / gent  - Left submuscular placement   - Prostate Cancer sp RALP / Rising PSA ,on surveillance  - scrotal US for testicular pain / tenderness     Today's visit:  #Erectile Dysfunction  - s/p coloplast IPP on 6/27/24  - Denies fever, chills, nausea, vomiting.    Duration: since 2021 (prostate ca)  s/p prostatectomy: yes  On nitrates/NTG?: No  Smoker: Marijuana  Partner: Partner(s)       Labs  C. trachomatis/N. gonorrhoeae, 6/24/24: Negative    Urine culture, 6/24/24: No significant growth    Staph/MRSA Screen Culture, 6/24/24: No Staphylococcus aureus isolated     3/26/24: UACX - 20,000 - 80,000 Streptococcus agalactiae (Group B Streptococcus)      Lab Results   Component Value Date    PSA 0.14 06/24/2024    PSA 0.11 02/05/2024    PSA <0.10 10/11/2022     Lab Results   Component Value Date    HGBA1C 5.8 (H) 06/24/2024       CBC, 6/24/24  Component      Latest Ref Rng 6/24/2024   LEUKOCYTES (10*3/UL) IN BLOOD BY AUTOMATED COUNT, American      4.4 - 11.3 x10*3/uL 5.6    nRBC      0.0 - 0.0 /100 WBCs 0.0    ERYTHROCYTES (10*6/UL) IN BLOOD BY AUTOMATED COUNT, American      4.50 - 5.90 x10*6/uL 4.06 (L)    HEMOGLOBIN      13.5 - 17.5 g/dL 12.3 (L)    HEMATOCRIT      41.0 - 52.0 % 37.1 (L)    MCV      80 - 100 fL 91    MCHC      32.0 - 36.0 g/dL 33.2    PLATELETS (10*3/UL) IN BLOOD AUTOMATED COUNT, American      150 - 450 x10*3/uL 223    RED CELL DISTRIBUTION WIDTH      11.5 - 14.5 % 14.2    NEUTROPHILS/100 LEUKOCYTES IN BLOOD BY AUTOMATED COUNT, American      40.0 - 80.0 % 57.5    Immature Granulocytes %, Automated      0.0 - 0.9 % 0.2    Lymphocytes %      13.0 - 44.0 % 27.4    Monocytes %      2.0 - 10.0 % 12.6    Eosinophils %      0.0 - 6.0 % 1.1    Basophils %      0.0 - 2.0 % 1.2    NEUTROPHILS (10*3/UL) IN BLOOD BY  AUTOMATED COUNT, Polish      1.20 - 7.70 x10*3/uL 3.24    Lymphocytes Absolute      1.20 - 4.80 x10*3/uL 1.54    Monocytes Absolute      0.10 - 1.00 x10*3/uL 0.71    Eosinophils Absolute      0.00 - 0.70 x10*3/uL 0.06    Basophils Absolute      0.00 - 0.10 x10*3/uL 0.07    MCH      26.0 - 34.0 pg 30.3    Immature Granulocytes Absolute, Automated      0.00 - 0.70 x10*3/uL 0.01       Legend:  (L) Low    BMP, 6/24/24.  Component      Latest Ref Rng 6/24/2024   GLUCOSE      74 - 99 mg/dL 92    SODIUM      136 - 145 mmol/L 138    POTASSIUM      3.5 - 5.3 mmol/L 4.5    CHLORIDE      98 - 107 mmol/L 106    Bicarbonate      21 - 32 mmol/L 23    Anion Gap      10 - 20 mmol/L 14    Blood Urea Nitrogen      6 - 23 mg/dL 26 (H)    Creatinine      0.50 - 1.30 mg/dL 1.96 (H)    Calcium      8.6 - 10.3 mg/dL 8.9    EGFR      >60 mL/min/1.73m*2 37 (L)       Legend:  (H) High  (L) Low    Medications:    Current Outpatient Medications:     amLODIPine (Norvasc) 10 mg tablet, Take 1 tablet (10 mg) by mouth once daily., Disp: , Rfl:     cetirizine (ZyrTEC) 10 mg tablet, Take 1 tablet (10 mg) by mouth if needed., Disp: , Rfl:     losartan (Cozaar) 100 mg tablet, Take 1 tablet (100 mg) by mouth once daily., Disp: , Rfl:     polyethylene glycol (Glycolax, Miralax) 17 gram packet, Take 17 g by mouth once daily., Disp: 30 packet, Rfl: 0    pregabalin (Lyrica) 150 mg capsule, Take 1 capsule (150 mg) by mouth 3 times a day., Disp: , Rfl:     spironolactone (Aldactone) 25 mg tablet, Take 1 tablet (25 mg) by mouth once daily., Disp: , Rfl:     Allergy:  Allergies   Allergen Reactions    Penicillins Hives, Rash and Unknown    Oxycodone Hives     Hives     Hives    Ciprofloxacin Rash    Flurbiprofen Rash      Exam  Implant: tips properly placed, inflates and deflates properly   Pump in place, nonfixed, able to inflate and deflate   No signs of infection erosion or extrusion   Left groin incision well healing    Assessment/Plan  #Erectile  Dysfunction after radical prostatectomy : refractory to medical management with PDE5i's and intracavernosal injection therapy  - s/p coloplast IPP on 6/27/24  - Reviewed pumping instructions  - Exercises given to be done daily.     #Prostate Cancer sp RALP / Rising PSA   - on surveillance     #Testicular pain / tenderness  - scrotal US ordered at last visit, not performed.     Follow up in three weeks  Scribe Attestation  By signing my name below, I, Katherine Quintana,   attest that this documentation has been prepared under the direction and in the presence of Zuleyma Kulkarni MD.

## 2024-08-07 ENCOUNTER — OFFICE VISIT (OUTPATIENT)
Dept: UROLOGY | Facility: CLINIC | Age: 64
End: 2024-08-07
Payer: MEDICARE

## 2024-08-07 DIAGNOSIS — R35.0 URINARY FREQUENCY: Primary | ICD-10-CM

## 2024-08-07 DIAGNOSIS — R31.21 ASYMPTOMATIC MICROSCOPIC HEMATURIA: ICD-10-CM

## 2024-08-07 DIAGNOSIS — R36.9 PENILE DISCHARGE: ICD-10-CM

## 2024-08-07 LAB
POC APPEARANCE, URINE: CLEAR
POC BILIRUBIN, URINE: NEGATIVE
POC BLOOD, URINE: NEGATIVE
POC COLOR, URINE: YELLOW
POC GLUCOSE, URINE: NEGATIVE MG/DL
POC KETONES, URINE: NEGATIVE MG/DL
POC LEUKOCYTES, URINE: ABNORMAL
POC NITRITE,URINE: NEGATIVE
POC PH, URINE: 6 PH
POC PROTEIN, URINE: NEGATIVE MG/DL
POC SPECIFIC GRAVITY, URINE: 1.02
POC UROBILINOGEN, URINE: 0.2 EU/DL

## 2024-08-07 PROCEDURE — 87086 URINE CULTURE/COLONY COUNT: CPT

## 2024-08-07 PROCEDURE — 1160F RVW MEDS BY RX/DR IN RCRD: CPT | Performed by: NURSE PRACTITIONER

## 2024-08-07 PROCEDURE — 81001 URINALYSIS AUTO W/SCOPE: CPT

## 2024-08-07 PROCEDURE — 81003 URINALYSIS AUTO W/O SCOPE: CPT | Performed by: NURSE PRACTITIONER

## 2024-08-07 PROCEDURE — 99024 POSTOP FOLLOW-UP VISIT: CPT | Performed by: NURSE PRACTITIONER

## 2024-08-07 PROCEDURE — 1159F MED LIST DOCD IN RCRD: CPT | Performed by: NURSE PRACTITIONER

## 2024-08-07 PROCEDURE — 51798 US URINE CAPACITY MEASURE: CPT | Performed by: NURSE PRACTITIONER

## 2024-08-07 PROCEDURE — 1036F TOBACCO NON-USER: CPT | Performed by: NURSE PRACTITIONER

## 2024-08-07 NOTE — PROGRESS NOTES
[facility] [place]  Outpatient Clinic Note    Subjective   William Bush is a 64 y.o. male    History of Present Illness   Patient presenting to clinic today for FUV. Recent IPP with Dr. Kulkarni 6/27/2024  History of ED, HTN, Prostate Cancer s/p RALP at [facility]. He reports he has been doing well since surgery. He is eating and drinking, as normal. Urine has remained clear, yellow. Bowels have returned to normal. Today's visit patient complains of penile discharge. He denies dysuria, frequency, urgency, fevers or chills. He has not been cleared for sexual activity with IPP, however, states that he has engaged in non-penetrating relations.   Urinalysis today: Small Leukocytes, Negative Nitrates, Neg RBC   PVR 76 ml     Past Medical History and Surgical History   Past Medical History:   Diagnosis Date    CKD (chronic kidney disease)     BUN/CR= 15/1.76 10/11/22    ED (erectile dysfunction)     History of stress test 04/26/2021    Normal 2.There is no scintigraphic evidence for inducible ischemia.3.No evidence of scarred myocardium.4.Left ventricle is normal in size.The left ventricle systolic fxn is normal.5.Right ventricle is normal in size.The right ventricle systolic function is normal. 6.This is a low risk scan. No distinct coronary calcifications. Gated Stress FBP Gated Rest    HTN (hypertension)     Neuropathy     Genital area- on lyrica    Prediabetes     Prostate cancer (Multi)     s/p prostate removal in 2021    Seasonal allergies      Past Surgical History:   Procedure Laterality Date    COLONOSCOPY      HERNIA REPAIR Right 04/12/2023    Lap inguinal    HERNIA REPAIR Left 07/16/2021    Inguinal    PROSTATE BIOPSY      PROSTATECTOMY  04/26/2021    Robotic       Medications  Current Outpatient Medications on File Prior to Visit   Medication Sig Dispense Refill    amLODIPine (Norvasc) 10 mg tablet Take 1 tablet (10 mg) by mouth once daily.      cetirizine (ZyrTEC) 10 mg tablet Take 1 tablet (10 mg) by mouth if  needed.      losartan (Cozaar) 100 mg tablet Take 1 tablet (100 mg) by mouth once daily.      pregabalin (Lyrica) 150 mg capsule Take 1 capsule (150 mg) by mouth 3 times a day.      spironolactone (Aldactone) 25 mg tablet Take 1 tablet (25 mg) by mouth once daily.       No current facility-administered medications on file prior to visit.       Objective   Physicial Exam  General: Well developed, well nourished, alert and cooperative, appears in no acute distress  Eyes: Non-injected conjunctiva, sclera clear, no proptosis  Cardiac: Extremities are warm and well perfused. No edema, cyanosis or pallor.   Lungs: Breathing is easy, non-labored. Speaking in clear and complete sentences. Normal diaphragmatic movement.  MSK: Ambulatory with steady gait, unassisted  Neuro: alert and oriented to person, place and time  Psych: Demonstrates good judgement and reason, without hallucinations, abnormal affect or abnormal behaviors.  Skin: no obvious lesions, no rashes.    Review of Systems  All other systems have been reviewed and are negative for complaint.    Assessment and Plan   Patient presenting to clinic today for FUV. Recent IPP with Dr. Kulkarni 6/27/2024  History of ED, HTN, Prostate Cancer s/p RALP at [facility]. He reports he has been doing well since surgery. He is eating and drinking, as normal. Urine has remained clear, yellow. Bowels have returned to normal. Today's visit patient complains of penile discharge. He denies dysuria, frequency, urgency, fevers or chills. He has not been cleared for sexual activity with IPP, however, states that he has engaged in non-penetrating relations.   Urinalysis today: Small Leukocytes, Negative Nitrates, Neg RBC   PVR 76 ml     Sending urine today for Culture, Chlamydia, Gonorrhea, Urea/Mycoplasma. Will call with results     Follow up as previously scheduled with Dr. Kulkarni 8/2//2024 for POV   All questions and concerns were addressed. Patient verbalizes understanding and  has no other questions at this time. If you have any questions about your care, do not hesitate to call and leave a message, we return calls in a timely manner.    Maryann Hyde-- DANIEL RON  Office Phone:  [phone]

## 2024-08-08 LAB
APPEARANCE UR: CLEAR
BACTERIA UR CULT: NORMAL
BILIRUB UR STRIP.AUTO-MCNC: NEGATIVE MG/DL
COLOR UR: YELLOW
GLUCOSE UR STRIP.AUTO-MCNC: NORMAL MG/DL
HYALINE CASTS #/AREA URNS AUTO: ABNORMAL /LPF
KETONES UR STRIP.AUTO-MCNC: NEGATIVE MG/DL
LEUKOCYTE ESTERASE UR QL STRIP.AUTO: ABNORMAL
MUCOUS THREADS #/AREA URNS AUTO: ABNORMAL /LPF
NITRITE UR QL STRIP.AUTO: NEGATIVE
PH UR STRIP.AUTO: 6 [PH]
PROT UR STRIP.AUTO-MCNC: ABNORMAL MG/DL
RBC # UR STRIP.AUTO: NEGATIVE /UL
RBC #/AREA URNS AUTO: ABNORMAL /HPF
RENAL EPI CELLS #/AREA UR COMP ASSIST: ABNORMAL /HPF
SP GR UR STRIP.AUTO: 1.02
SQUAMOUS #/AREA URNS AUTO: ABNORMAL /HPF
UROBILINOGEN UR STRIP.AUTO-MCNC: NORMAL MG/DL
WBC #/AREA URNS AUTO: >50 /HPF
WBC CLUMPS #/AREA URNS AUTO: ABNORMAL /HPF

## 2024-08-22 ENCOUNTER — APPOINTMENT (OUTPATIENT)
Dept: HEMATOLOGY/ONCOLOGY | Facility: HOSPITAL | Age: 64
End: 2024-08-22
Payer: COMMERCIAL

## 2024-08-24 NOTE — PROGRESS NOTES
HPI   65 yo male patient complains of  erectile dysfunction. Hx of Pre-diabetes, HTN, prostate ca sp RALP at [facility]     Last visit 7/17/24  - s/p coloplast IPP on 6/27/24  - Reviewed pumping instructions  - Exercises given to be done daily.  - on surveillance  - scrotal US ordered at last visit, not performed.      Today's visit:  #Erectile Dysfunction  - s/p coloplast IPP on 6/27/24  - Denies fever, chills, nausea, vomiting  -no hematuria  -pumping and deflating ok     #STD  -STD found, discharge  -per patient tested + for trich at [facility]  -was treated there, has completed treatment     Duration: since 2021 (prostate ca)  s/p prostatectomy: yes  On nitrates/NTG?: No  Smoker: Marijuana  Partner: Partner(s)      Labs  Lab Results   Component Value Date    PSA 0.14 06/24/2024     Lab Results   Component Value Date    HGBA1C 5.8 (H) 06/24/2024         Medications:    Current Outpatient Medications:     amLODIPine (Norvasc) 10 mg tablet, Take 1 tablet (10 mg) by mouth once daily., Disp: , Rfl:     cetirizine (ZyrTEC) 10 mg tablet, Take 1 tablet (10 mg) by mouth if needed., Disp: , Rfl:     losartan (Cozaar) 100 mg tablet, Take 1 tablet (100 mg) by mouth once daily., Disp: , Rfl:     pregabalin (Lyrica) 150 mg capsule, Take 1 capsule (150 mg) by mouth 3 times a day., Disp: , Rfl:     spironolactone (Aldactone) 25 mg tablet, Take 1 tablet (25 mg) by mouth once daily., Disp: , Rfl:     Allergy:  Allergies   Allergen Reactions    Penicillins Hives, Rash and Unknown    Oxycodone Hives     Hives     Hives    Ciprofloxacin Rash    Flurbiprofen Rash        Exam  CONSTITUTIONAL:        No acute distress    HEAD:        Normocephalic and atraumatic    CHEST / RESPIRATORY      no excess work of breathing, no respiratory distress,    ABDOMEN / GASTROINTESTINAL:        Abdomen nondistended      Testicles descended bilaterally, nontender, no masses  Vasa palpable bilaterally  Penis circ'd, no lesions, no plaques     Implant: tips properly  placed, inflates and deflates properly   Pump in place, nonfixed, able to inflate and deflate   No signs of infection erosion or extrusion     Assessment/Plan  #Erectile Dysfunction after radical prostatectomy : refractory to medical management with PDE5i's and intracavernosal injection therapy  - s/p coloplast IPP on 6/27/24  - Reviewed pumping instructions  - Exercises given to be done daily.  -ok to have sex  #Prostate Cancer sp RALP / Rising PSA   - on surveillance       #STD  -treated elsewhere \  -had trichomoniasis     Fu in 3 months     Scribe Attestation  By signing my name below, I, Katherine Brenner, attest that this documentation  has been prepared under the direction and in the presence of Zuleyma Kulkarni MD.

## 2024-08-28 ENCOUNTER — APPOINTMENT (OUTPATIENT)
Dept: UROLOGY | Facility: CLINIC | Age: 64
End: 2024-08-28
Payer: COMMERCIAL

## 2024-08-28 VITALS — TEMPERATURE: 97.1 F | BODY MASS INDEX: 28.02 KG/M2 | HEIGHT: 72 IN

## 2024-08-28 DIAGNOSIS — N52.9 ERECTILE DYSFUNCTION, UNSPECIFIED ERECTILE DYSFUNCTION TYPE: Primary | ICD-10-CM

## 2024-08-28 DIAGNOSIS — N50.819 PAIN IN TESTICLE, UNSPECIFIED LATERALITY: ICD-10-CM

## 2024-08-28 PROCEDURE — 1159F MED LIST DOCD IN RCRD: CPT | Performed by: UROLOGY

## 2024-08-28 PROCEDURE — 99024 POSTOP FOLLOW-UP VISIT: CPT | Performed by: UROLOGY

## 2024-08-28 PROCEDURE — 1125F AMNT PAIN NOTED PAIN PRSNT: CPT | Performed by: UROLOGY

## 2024-08-28 PROCEDURE — 1036F TOBACCO NON-USER: CPT | Performed by: UROLOGY

## 2024-08-28 ASSESSMENT — PAIN SCALES - GENERAL: PAINLEVEL: 4

## 2024-08-28 NOTE — LETTER
August 28, 2024     Patient: William Bush   YOB: 1960   Date of Visit: 8/28/2024       To Whom It May Concern:    William Bush was seen in my clinic on 8/28/2024 at 10:40 am. Please excuse William for his absence from work on this day to make the appointment.    If you have any questions or concerns, please don't hesitate to call.         Sincerely,         Zuleyma Kulkarni MD        CC: No Recipients

## 2024-08-29 ENCOUNTER — APPOINTMENT (OUTPATIENT)
Dept: HEMATOLOGY/ONCOLOGY | Facility: HOSPITAL | Age: 64
End: 2024-08-29
Payer: COMMERCIAL

## 2024-10-03 ENCOUNTER — TELEMEDICINE (OUTPATIENT)
Dept: HEMATOLOGY/ONCOLOGY | Facility: HOSPITAL | Age: 64
End: 2024-10-03
Payer: MEDICARE

## 2024-10-03 DIAGNOSIS — C61 PROSTATE CANCER (MULTI): ICD-10-CM

## 2024-10-03 PROCEDURE — 99214 OFFICE O/P EST MOD 30 MIN: CPT | Performed by: INTERNAL MEDICINE

## 2024-10-03 NOTE — PROGRESS NOTES
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 20% of cores)   S/P RP at [facility] - path c/w high-risk disease pT3a/GS7/Node negative  Colt PSA undetectable however PSA rising since 2/2024 - 0.11 and summer 0.14  Completed a Penile implant at [facility]  Discussed possibility of BCR and thus he will get a PSA next week  If rising; PET PSMA and if negative we will then define role for salvage RT    Lab Results   Component Value Date    PSA 0.14 06/24/2024    PSA 0.11 02/05/2024    PSA <0.10 10/11/2022     Lab Results   Component Value Date    WBC 5.6 06/24/2024    HGB 12.3 (L) 06/24/2024    HCT 37.1 (L) 06/24/2024    MCV 91 06/24/2024     06/24/2024     Lab Results   Component Value Date    GLUCOSE 92 06/24/2024    CALCIUM 8.9 06/24/2024     06/24/2024    K 4.5 06/24/2024    CO2 23 06/24/2024     06/24/2024    BUN 26 (H) 06/24/2024    CREATININE 1.96 (H) 06/24/2024     No results found for: \"TESTOSTERONE\"  Lab Results   Component Value Date    ALT 15 10/11/2022    AST 16 10/11/2022    ALKPHOS 45 10/11/2022    BILITOT 0.5 10/11/2022     Benny Sloan MD, FACP  Chief, Solid Tumor Oncology Division   Medical Oncology  Professor of Medicine and Urology  /[facility]   "

## 2024-10-08 ENCOUNTER — LAB (OUTPATIENT)
Dept: LAB | Facility: HOSPITAL | Age: 64
End: 2024-10-08
Payer: MEDICARE

## 2024-10-08 DIAGNOSIS — C61 PROSTATE CANCER (MULTI): ICD-10-CM

## 2024-10-08 LAB — PSA SERPL-MCNC: 0.21 NG/ML

## 2024-10-08 PROCEDURE — 84153 ASSAY OF PSA TOTAL: CPT

## 2024-10-08 PROCEDURE — 36415 COLL VENOUS BLD VENIPUNCTURE: CPT

## 2024-10-17 ENCOUNTER — TELEMEDICINE (OUTPATIENT)
Dept: HEMATOLOGY/ONCOLOGY | Facility: HOSPITAL | Age: 64
End: 2024-10-17
Payer: MEDICARE

## 2024-10-17 DIAGNOSIS — C61 PROSTATE CANCER (MULTI): ICD-10-CM

## 2024-10-17 NOTE — PROGRESS NOTES
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 (iPSA18/cT1/GS6 20% of cores)   S/P RP at [facility] - path c/w high-risk disease pT3a/GS7/Node negative  Colt PSA was undetectable but since his PSA has been rising; PSADT>6 months  Discussed significance of his PSA  Favored PET PSMA and labs and me in [place]    Lab Results   Component Value Date    PSA 0.21 10/08/2024    PSA 0.14 06/24/2024    PSA 0.11 02/05/2024     Lab Results   Component Value Date    WBC 5.6 06/24/2024    HGB 12.3 (L) 06/24/2024    HCT 37.1 (L) 06/24/2024    MCV 91 06/24/2024     06/24/2024     Lab Results   Component Value Date    GLUCOSE 92 06/24/2024    CALCIUM 8.9 06/24/2024     06/24/2024    K 4.5 06/24/2024    CO2 23 06/24/2024     06/24/2024    BUN 26 (H) 06/24/2024    CREATININE 1.96 (H) 06/24/2024     No results found for: \"TESTOSTERONE\"  Lab Results   Component Value Date    ALT 15 10/11/2022    AST 16 10/11/2022    ALKPHOS 45 10/11/2022    BILITOT 0.5 10/11/2022     Benny Sloan MD, FACP  Chief, Solid Tumor Oncology Division   Medical Oncology  Professor of Medicine and Urology  /[facility]     "

## 2024-10-23 ENCOUNTER — APPOINTMENT (OUTPATIENT)
Dept: DERMATOLOGY | Facility: CLINIC | Age: 64
End: 2024-10-23
Payer: MEDICARE

## 2024-10-23 DIAGNOSIS — L73.1 PSEUDOFOLLICULITIS BARBAE: Primary | ICD-10-CM

## 2024-10-23 PROCEDURE — 99203 OFFICE O/P NEW LOW 30 MIN: CPT | Performed by: DERMATOLOGY

## 2024-10-23 RX ORDER — CLINDAMYCIN PHOSPHATE 10 UG/ML
LOTION TOPICAL DAILY
Qty: 60 ML | Refills: 11 | Status: SHIPPED | OUTPATIENT
Start: 2024-10-23 | End: 2025-10-23

## 2024-10-23 RX ORDER — BENZOYL PEROXIDE 50 MG/ML
1 LIQUID TOPICAL DAILY
Qty: 120 G | Refills: 11 | Status: SHIPPED | OUTPATIENT
Start: 2024-10-23 | End: 2025-10-23

## 2024-11-26 ENCOUNTER — APPOINTMENT (OUTPATIENT)
Dept: UROLOGY | Facility: CLINIC | Age: 64
End: 2024-11-26
Payer: MEDICARE

## 2025-01-13 ENCOUNTER — HOSPITAL ENCOUNTER (OUTPATIENT)
Dept: RADIOLOGY | Facility: HOSPITAL | Age: 65
Discharge: HOME | End: 2025-01-13
Payer: MEDICARE

## 2025-01-13 DIAGNOSIS — C61 PROSTATE CANCER (MULTI): ICD-10-CM

## 2025-01-13 PROCEDURE — A9596 HC RX 343 DIAGNOSTIC RADIOPHARMACEUTICALS: HCPCS | Mod: TB | Performed by: INTERNAL MEDICINE

## 2025-01-13 PROCEDURE — 78815 PET IMAGE W/CT SKULL-THIGH: CPT | Mod: PI

## 2025-01-13 PROCEDURE — 3430000001 HC RX 343 DIAGNOSTIC RADIOPHARMACEUTICALS: Mod: TB | Performed by: INTERNAL MEDICINE

## 2025-01-13 PROCEDURE — 78815 PET IMAGE W/CT SKULL-THIGH: CPT | Mod: PET TUMOR INIT TX STRAT | Performed by: RADIOLOGY

## 2025-01-13 RX ADMIN — KIT FOR THE PREPARATION OF GALLIUM GA 68 GOZETOTIDE INJECTION 5.4 MILLICURIE: KIT INTRAVENOUS at 14:39

## 2025-01-16 ENCOUNTER — LAB (OUTPATIENT)
Dept: LAB | Facility: HOSPITAL | Age: 65
End: 2025-01-16
Payer: MEDICARE

## 2025-01-16 ENCOUNTER — OFFICE VISIT (OUTPATIENT)
Dept: HEMATOLOGY/ONCOLOGY | Facility: HOSPITAL | Age: 65
End: 2025-01-16
Payer: MEDICARE

## 2025-01-16 VITALS
SYSTOLIC BLOOD PRESSURE: 115 MMHG | TEMPERATURE: 96.4 F | HEART RATE: 80 BPM | RESPIRATION RATE: 22 BRPM | BODY MASS INDEX: 28.79 KG/M2 | DIASTOLIC BLOOD PRESSURE: 75 MMHG | WEIGHT: 212.3 LBS | OXYGEN SATURATION: 100 %

## 2025-01-16 DIAGNOSIS — R10.31 INGUINAL PAIN, RIGHT: ICD-10-CM

## 2025-01-16 DIAGNOSIS — C61 PROSTATE CANCER (MULTI): ICD-10-CM

## 2025-01-16 DIAGNOSIS — C61 PROSTATE CANCER (MULTI): Primary | ICD-10-CM

## 2025-01-16 LAB
ALBUMIN SERPL BCP-MCNC: 4.3 G/DL (ref 3.4–5)
ALP SERPL-CCNC: 61 U/L (ref 33–136)
ALT SERPL W P-5'-P-CCNC: 19 U/L (ref 10–52)
ANION GAP SERPL CALC-SCNC: 11 MMOL/L (ref 10–20)
AST SERPL W P-5'-P-CCNC: 18 U/L (ref 9–39)
BILIRUB SERPL-MCNC: 0.3 MG/DL (ref 0–1.2)
BUN SERPL-MCNC: 26 MG/DL (ref 6–23)
CALCIUM SERPL-MCNC: 9.1 MG/DL (ref 8.6–10.3)
CHLORIDE SERPL-SCNC: 106 MMOL/L (ref 98–107)
CO2 SERPL-SCNC: 30 MMOL/L (ref 21–32)
CREAT SERPL-MCNC: 1.96 MG/DL (ref 0.5–1.3)
EGFRCR SERPLBLD CKD-EPI 2021: 37 ML/MIN/1.73M*2
GLUCOSE SERPL-MCNC: 88 MG/DL (ref 74–99)
POTASSIUM SERPL-SCNC: 4.5 MMOL/L (ref 3.5–5.3)
PROT SERPL-MCNC: 7.2 G/DL (ref 6.4–8.2)
PSA SERPL-MCNC: 0.16 NG/ML
SODIUM SERPL-SCNC: 142 MMOL/L (ref 136–145)

## 2025-01-16 PROCEDURE — 1159F MED LIST DOCD IN RCRD: CPT | Performed by: INTERNAL MEDICINE

## 2025-01-16 PROCEDURE — 36415 COLL VENOUS BLD VENIPUNCTURE: CPT

## 2025-01-16 PROCEDURE — 99214 OFFICE O/P EST MOD 30 MIN: CPT | Performed by: INTERNAL MEDICINE

## 2025-01-16 PROCEDURE — 84153 ASSAY OF PSA TOTAL: CPT

## 2025-01-16 PROCEDURE — 1126F AMNT PAIN NOTED NONE PRSNT: CPT | Performed by: INTERNAL MEDICINE

## 2025-01-16 PROCEDURE — 80053 COMPREHEN METABOLIC PANEL: CPT

## 2025-01-16 ASSESSMENT — PAIN SCALES - GENERAL: PAINLEVEL_OUTOF10: 0-NO PAIN

## 2025-01-16 NOTE — PROGRESS NOTES
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 This pain occurs intermittently ever since radical prostatectomy but has been worsening lately. Otherwise, denies any new issues.       General: awake, alert, conversant, appears stated age  HEENT: pupils equal and round, no scleral icterus or conjunctivitis  Skin: no suspect lesions or rashes noted on visible skin  Chest: ctab, normal respiratory effort  Cardiac: regular rate and rhythm, normal s1, s2, no M/R/G  Abdomen: soft, ND, NT, no involuntary guarding  : exquisite tenderness in right inguinal region without palpable hernia   EXT: no peripheral edema, no asymmetry noted  MSK: no focal joint swelling noted  Neuro: AOx4, moving all limbs spontaneously, follows commands    Lab Results   Component Value Date    PSA 0.21 10/08/2024    PSA 0.14 06/24/2024    PSA 0.11 02/05/2024     Lab Results   Component Value Date    WBC 5.6 06/24/2024    HGB 12.3 (L) 06/24/2024    HCT 37.1 (L) 06/24/2024    MCV 91 06/24/2024     06/24/2024     Lab Results   Component Value Date    GLUCOSE 92 06/24/2024    CALCIUM 8.9 06/24/2024     06/24/2024    K 4.5 06/24/2024    CO2 23 06/24/2024     06/24/2024    BUN 26 (H) 06/24/2024    CREATININE 1.96 (H) 06/24/2024     No results found for: \"TESTOSTERONE\"  Lab Results   Component Value Date    ALT 15 10/11/2022    AST 16 10/11/2022    ALKPHOS 45 10/11/2022    BILITOT 0.5 10/11/2022     PSMA PET 1/13/25:  IMPRESSION:  1. Status post radical prostatectomy, no focally increased Ga68 PSMA  expression within the postsurgical bed to suggest local recurrence.  2. There is no evidence for Ga68 " PSMA-avid pelvic lymph node  metastasis.  3. There is no evidence for Ga68 PSMA-avid distant metastatic disease.      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 Patient pending updated PSA later today. If doubling time remains stable, will hold off initiation of treatment in favor of monitoring PSA every 3 months. Patient would like to avoid side effects of hormonal therapy as long as possible.     Will obtain inguinal ultrasound to evaluate patient's pain.     Will call patient with updated PSA and tentatively RTC 3 months.     Patient seen and discussed with Dr. Sloan.     Braulio Whaley MD  Hematology-Oncology Fellow, PGY5

## 2025-01-29 ENCOUNTER — HOSPITAL ENCOUNTER (OUTPATIENT)
Dept: RADIOLOGY | Facility: HOSPITAL | Age: 65
Discharge: HOME | End: 2025-01-29
Payer: MEDICARE

## 2025-01-29 DIAGNOSIS — N50.819 PAIN IN TESTICLE, UNSPECIFIED LATERALITY: ICD-10-CM

## 2025-01-29 DIAGNOSIS — R10.31 INGUINAL PAIN, RIGHT: ICD-10-CM

## 2025-01-29 PROCEDURE — 93975 VASCULAR STUDY: CPT

## 2025-01-29 PROCEDURE — 76870 US EXAM SCROTUM: CPT | Performed by: RADIOLOGY

## 2025-01-29 PROCEDURE — 93976 VASCULAR STUDY: CPT | Performed by: RADIOLOGY

## 2025-01-29 PROCEDURE — 76857 US EXAM PELVIC LIMITED: CPT

## 2025-03-26 ENCOUNTER — APPOINTMENT (OUTPATIENT)
Dept: DERMATOLOGY | Facility: CLINIC | Age: 65
End: 2025-03-26
Payer: MEDICARE

## 2025-04-15 ENCOUNTER — TELEMEDICINE (OUTPATIENT)
Dept: HEMATOLOGY/ONCOLOGY | Facility: HOSPITAL | Age: 65
End: 2025-04-15
Payer: MEDICARE

## 2025-04-15 DIAGNOSIS — C61 PROSTATE CANCER (MULTI): ICD-10-CM

## 2025-04-15 PROCEDURE — 99214 OFFICE O/P EST MOD 30 MIN: CPT | Performed by: INTERNAL MEDICINE

## 2025-04-16 NOTE — PROGRESS NOTES
Medical Oncology    William Days  [id number]  4/15/2025    65 yo male known to us with high-risk PCa (iPSA18/cT1/GS6 20% of cores)   S/P RP at [facility] - path c/w high-risk disease pT3a/GS7/Node negative  Did well in surgery and Colt PSA was undetectable but since his PSA has been rising; PSADT>6 months  PET PSMA 1/2025 without detectable disease  PSA now down  Pt c/w R inguinal pain  US showed sec inflammatory changes in R inguinal area    Study Result    Narrative & Impression   Interpreted By:  Shoaib Griffin and Sheng Max   STUDY:  US PELVIS LIMITED; US SCROTUM WITH DOPPLER  1/29/2025 3:45 pm      INDICATION:  63 y/o   M with  Signs/Symptoms:right inguinal pain, evaluate for  hernia; Signs/Symptoms:scrotal pain.      ,R10.31 Right lower quadrant pain; ,N50.819 Testicular pain,  unspecified      Per EMR: 64-year-old male history of prostate cancer status post  radical prostatectomy. PET-CT on 01/2025 demonstrates no residual  detectable disease. Ultrasound to evaluate for right inguinal/scrotal  pain.      COMPARISON:  PET-CT 01/13/2025      ACCESSION NUMBER(S):  [id number]; [id number]      ORDERING CLINICIAN:  MARISEL MACIAS      TECHNIQUE:  Routine ultrasound of the scrotum, pelvic and bilateral inguinal  canals were performed with duplex Doppler (color and spectral)  evaluation.   Static images were obtained for remote interpretation.      FINDINGS:  RIGHT TESTICLE:  Size:  4.0 x 1.5 x 1.9 cm.      Homogeneous in echotexture without focal lesion.  It demonstrates  color/arterial flow.      There are two echogenic structures adjacent to the epididymal head  measuring 0.5 x 0.8 x 0.5 cm (image 29/59) and 0.4 x 0.3 x 0.6 cm  (image 30/59) consistent with epididymal appendages. Penile pump from  penile prosthesis is present within the right scrotum, better  assessed on prior PET-CT from 01/13/2025.      LEFT TESTICLE:  Size:  3.2 x 1.6 x 2.3 cm.      Homogeneous in echotexture without  "focal lesion. It demonstrates  color/arterial flow.      EPIDIDYMIDES:   No significant enlargement or hyperemia.      HYDROCELE:   Small right-sided hydrocele.      OTHER:  Limited ultrasound of the pelvis and bilateral inguinal regions  demonstrate a heterogenous hypoechoic structure with mild surrounding  hyperemia at the midline to right inguinal region (image 12/18)  raising concern for a developing phlegmonous collection. There is  increased soft tissue and  prominent vasculature, surrounding this  lesion (image /143).      IMPRESSION:  1. Findings above are suspicious for a developing phlegmonous  collection at the midline to right inguinal region as described.  Correlate with clinical examination.  2. Penile pump from penile prosthesis is present in the right  scrotum, better assessed on prior PET-CT from 01/13/2025.  3. Two epididymal appendages are present adjacent to the epididymal  head in the right testicle.  4. Small right-sided hydrocele.          I personally reviewed the images/study and I agree with the findings  as stated by Dr. Haile Mullen. This study was interpreted at [facility], [place], [place].      MACRO:  None       Lab Results   Component Value Date    PSA 0.16 01/16/2025    PSA 0.21 10/08/2024    PSA 0.14 06/24/2024     Lab Results   Component Value Date    WBC 5.6 06/24/2024    HGB 12.3 (L) 06/24/2024    HCT 37.1 (L) 06/24/2024    MCV 91 06/24/2024     06/24/2024     Lab Results   Component Value Date    GLUCOSE 88 01/16/2025    CALCIUM 9.1 01/16/2025     01/16/2025    K 4.5 01/16/2025    CO2 30 01/16/2025     01/16/2025    BUN 26 (H) 01/16/2025    CREATININE 1.96 (H) 01/16/2025     No results found for: \"TESTOSTERONE\"  Lab Results   Component Value Date    ALT 19 01/16/2025    AST 18 01/16/2025    ALKPHOS 61 01/16/2025    BILITOT 0.3 01/16/2025       Will undergo a CT A/P and labs and see us    Benny Sloan MD, FACP  Chief, " Solid Tumor Oncology Division   Medical Oncology  Professor of Medicine and Urology  /[facility]

## 2025-04-17 ENCOUNTER — APPOINTMENT (OUTPATIENT)
Dept: HEMATOLOGY/ONCOLOGY | Facility: HOSPITAL | Age: 65
End: 2025-04-17
Payer: MEDICARE

## 2025-04-21 ENCOUNTER — TELEPHONE (OUTPATIENT)
Dept: HEMATOLOGY/ONCOLOGY | Facility: HOSPITAL | Age: 65
End: 2025-04-21
Payer: MEDICARE

## 2025-04-21 DIAGNOSIS — C61 PROSTATE CANCER (MULTI): Primary | ICD-10-CM

## 2025-04-21 NOTE — TELEPHONE ENCOUNTER
Patient needs a need a new order for CT. Its put in w/without contrast and cannot have contrast at all

## 2025-04-22 NOTE — TELEPHONE ENCOUNTER
Radiology scheduling called, the patient is on their back line asking that his CT order be changed to without contrast (not with and without), he cannot have any contrast at all, once order updated I will be happy to call the patient to let him know and transfer him to radiology scheduling to get scheduled.

## 2025-04-22 NOTE — TELEPHONE ENCOUNTER
Reason for Conversation  Lab Orders    Background   I called patient he explained that he was told to avoid contrast due to his kidney function.  Pt would like test reordered without contrast.     Disposition   No disposition on file.

## 2025-05-06 ENCOUNTER — HOSPITAL ENCOUNTER (OUTPATIENT)
Dept: RADIOLOGY | Facility: HOSPITAL | Age: 65
Discharge: HOME | End: 2025-05-06
Payer: MEDICARE

## 2025-05-06 DIAGNOSIS — C61 PROSTATE CANCER (MULTI): ICD-10-CM

## 2025-05-06 PROCEDURE — 74176 CT ABD & PELVIS W/O CONTRAST: CPT | Performed by: RADIOLOGY

## 2025-05-06 PROCEDURE — 74176 CT ABD & PELVIS W/O CONTRAST: CPT

## 2025-05-06 PROCEDURE — 71250 CT THORAX DX C-: CPT | Performed by: RADIOLOGY

## 2025-05-15 ENCOUNTER — TELEMEDICINE (OUTPATIENT)
Dept: HEMATOLOGY/ONCOLOGY | Facility: HOSPITAL | Age: 65
End: 2025-05-15
Payer: MEDICARE

## 2025-05-15 DIAGNOSIS — N18.32 CHRONIC KIDNEY DISEASE, STAGE 3B (MULTI): ICD-10-CM

## 2025-05-15 DIAGNOSIS — C61 PROSTATE CANCER (MULTI): Primary | ICD-10-CM

## 2025-05-15 PROCEDURE — 99214 OFFICE O/P EST MOD 30 MIN: CPT | Performed by: INTERNAL MEDICINE

## 2025-05-15 NOTE — PROGRESS NOTES
" Medical Oncology Virtual/Phone Call      William Bush  [id number]  5/15/2025    63 yo male known to us with high-risk PCa (iPSA18/cT1/GS6 20% of cores)     S/P RP at [facility] - path c/w high-risk disease pT3a/GS7/Node negative  Did well in surgery and Colt PSA was undetectable but since his PSA has been rising; PSADT>6 months  PET PSMA 1/2025 without detectable disease  PSA now down  Pt c/w R inguinal pain  US showed sec inflammatory changes in R inguinal area  Pain in R inguinal area remains - taking tramadol and pre-gabapentin- rx's by local team    No PSA since Jan     Lab Results   Component Value Date    PSA 0.16 01/16/2025    PSA 0.21 10/08/2024    PSA 0.14 06/24/2024     Lab Results   Component Value Date    WBC 5.6 06/24/2024    HGB 12.3 (L) 06/24/2024    HCT 37.1 (L) 06/24/2024    MCV 91 06/24/2024     06/24/2024     Lab Results   Component Value Date    GLUCOSE 88 01/16/2025    CALCIUM 9.1 01/16/2025     01/16/2025    K 4.5 01/16/2025    CO2 30 01/16/2025     01/16/2025    BUN 26 (H) 01/16/2025    CREATININE 1.96 (H) 01/16/2025     No results found for: \"TESTOSTERONE\"  Lab Results   Component Value Date    ALT 19 01/16/2025    AST 18 01/16/2025    ALKPHOS 61 01/16/2025    BILITOT 0.3 01/16/2025         Narrative & Impression   Interpreted By:  Rohan Hebert and Jiang Sirui   STUDY:  CT CHEST ABDOMEN PELVIS WO CONTRAST;  5/6/2025 3:25 pm      INDICATION:  Signs/Symptoms:prostate cancer.      ,C61 Malignant neoplasm of prostate (Multi)      COMPARISON:  PSMA PET-CT 01/13/2025      ACCESSION NUMBER(S):  [id number]      ORDERING CLINICIAN:  GRIS KOVACS      TECHNIQUE:  CT of the chest, abdomen, and pelvis was performed.  Contiguous axial  images were obtained at 3 mm slice thickness through the chest,  abdomen and pelvis. Coronal and sagittal reconstructions at 3 mm  slice thickness were performed. No intravenous contrast was  administered.      FINDINGS:  CHEST:      LUNG/PLEURA/LARGE " AIRWAYS:  The trachea and central airways are patent. No endobronchial lesion.  No consolidation, pleural effusion, or pneumothorax. Calcified  granuloma of the right lower lobe. No new suspicious or enlarging  pulmonary nodules identified.      VESSELS:  Aorta and pulmonary arteries are normal caliber.  No atherosclerotic  changes of the aorta are identified.  No coronary artery  calcifications are present.      HEART:  The heart is normal in size.  No pericardial effusion      MEDIASTINUM AND NEHAL:  Calcified right hilar lymph nodes are noted. No new thoracic  lymphadenopathy. The esophagus is unremarkable.      CHEST WALL AND LOWER NECK:  The soft tissues of the chest wall demonstrate no gross abnormality.  The visualized thyroid gland appears within normal limits.      ABDOMEN:      LIVER:  The liver is normal in size without evidence of focal liver lesions.      BILE DUCTS:  The intrahepatic and extrahepatic ducts are not dilated.      GALLBLADDER:  No calcified stones. No wall thickening.      PANCREAS:  Unremarkable      SPLEEN:  Unremarkable      ADRENAL GLANDS:  Unremarkable      KIDNEYS AND URETERS:  The left kidney is not visualized. Right kidney is normal in size and  appearance. No right-sided hydroureteronephrosis or  nephroureterolithiasis. There is a hyperdense lesion of the right  inferior pole measuring 1.5 cm (series 2, image 129) as well as an  additional 1.0 cm hyperdense lesion of the right superior pole (image  112).      PELVIS:      BLADDER:  The urinary bladder is decompressed, limited for evaluation.      REPRODUCTIVE ORGANS:  The prostate is surgically absent.      BOWEL:  The stomach and duodenum are unremarkable. The small and large bowel  demonstrate no abnormal bowel thickening or dilatation. Scattered  colonic diverticulosis without evidence of acute diverticulitis.  Appendix is unremarkable.          VESSELS:  Mild atherosclerotic calcification of the abdominal aorta without  AAA.  The IVC is unremarkable.      PERITONEUM/RETROPERITONEUM/LYMPH NODES:  No ascites or free air, no fluid collection.  No abdominopelvic  lymphadenopathy is present. A penile prosthetic reservoir is noted in  the left inguinal region.      BONE AND SOFT TISSUE:  No suspicious osseous lesions are identified. Degenerative discogenic  disease is noted in the lower thoracic and lumbar spine.  The  abdominal wall soft tissues appear normal.      IMPRESSION:  Prostate cancer restaging scan. When compared to the prior PET-CT  dated 01/13/2025:  1. Stable postsurgical changes of a prostatectomy without evidence of  locoregional recurrence or new sites of metastatic disease.  2. Indeterminate hyperdense lesions of the right kidney, potentially  hemorrhagic or proteinaceous cysts. Attention on follow-up exams or  dedicated multiphase contrast-enhanced renal MRI is recommended.  3. Additional stable chronic and incidental findings described above.      I personally reviewed the image(s) / study and I agree with the  findings as stated by Belkis Everett MD. This study was interpreted at  [facility], [place], [place].      MACRO:  None      Signed by: Rohan Hebert 5/6/2025 4:16 PM  Dictation workstation:   [id number]       A/P: High-risk PCa    Remains MARINA  Needs PSA  Discussed CT findings - can't explain his pelvic pain (has had significant w/u for this and all has been negative)  Favored to continue with local PCP; pain makes me wonder if this is post surgical since RP  F/u virtually in 3months  If rising PSA will consider something different

## 2025-08-14 ENCOUNTER — TELEMEDICINE (OUTPATIENT)
Dept: HEMATOLOGY/ONCOLOGY | Facility: HOSPITAL | Age: 65
End: 2025-08-14
Payer: MEDICARE

## 2025-08-14 VITALS
SYSTOLIC BLOOD PRESSURE: 124 MMHG | WEIGHT: 194.9 LBS | BODY MASS INDEX: 26.43 KG/M2 | OXYGEN SATURATION: 100 % | HEART RATE: 67 BPM | TEMPERATURE: 96.8 F | RESPIRATION RATE: 18 BRPM | DIASTOLIC BLOOD PRESSURE: 72 MMHG

## 2025-08-14 DIAGNOSIS — C61 PROSTATE CANCER (MULTI): ICD-10-CM

## 2025-08-14 PROCEDURE — 99214 OFFICE O/P EST MOD 30 MIN: CPT | Performed by: INTERNAL MEDICINE

## 2025-08-14 PROCEDURE — 1126F AMNT PAIN NOTED NONE PRSNT: CPT | Performed by: INTERNAL MEDICINE

## 2025-08-14 ASSESSMENT — PAIN SCALES - GENERAL: PAINLEVEL_OUTOF10: 0-NO PAIN

## (undated) DEVICE — TIP, SUCTION, YANKAUER, BULB, ADULT

## (undated) DEVICE — SPONGE, LAP, XRAY DECT, 18IN X 18IN, W/MASTER DMT, STERILE

## (undated) DEVICE — WOUND SYSTEM, DEBRIDEMENT & CLEANING, O.R DUOPAK

## (undated) DEVICE — SYRINGE, 50 CC, IRRIGATION, CATHETER TIP, DISPOSABLE, STERILE, LF

## (undated) DEVICE — SUTURE, VICRYL, 2-0, 27 IN, UR-6, VIOLET

## (undated) DEVICE — KIT, MINOR, DOUBLE BASIN

## (undated) DEVICE — ADHESIVE, SKIN, LIQUIBAND EXCEED

## (undated) DEVICE — NEEDLE, HYPODERMIC, 23 GA X 1.5 IN

## (undated) DEVICE — DRESSING, ADHESIVE, ISLAND, TELFA, 2 X 3.75 IN, LF

## (undated) DEVICE — STATLOCK, FOLEY SWIVEL, SILICONE TRICOT

## (undated) DEVICE — SUTURE, ETHILON, 2-0, 18 IN, FS, BLACK, BX/12

## (undated) DEVICE — SYSTEM, PENILE IMPLANT, W/6 BLUNT TIP, 12MM STAY HOOKS

## (undated) DEVICE — SUPPORTER, ATHLETIC, ADULT, X-LARGE

## (undated) DEVICE — TOWEL, SURGICAL, NEURO, O/R, 16 X 26, BLUE, STERILE

## (undated) DEVICE — Device

## (undated) DEVICE — SYRINGE, 20 CC, LUER LOCK, MONOJECT, W/O CAP, LF

## (undated) DEVICE — DRAPE, SHEET, U, STERI DRAPE, 47 X 51 IN, DISPOSABLE, STERILE

## (undated) DEVICE — TUBING, SUCTION, CONNECTING, STERILE 0.25 X 120 IN., LF

## (undated) DEVICE — SUTURE, MONOCRYL, 4-0, 18 IN, PS2, UNDYED

## (undated) DEVICE — EVACUATOR, WOUND, SUCTION, CLOSED, JACKSON-PRATT, 100 CC, SILICONE

## (undated) DEVICE — SYRINGE, 60 CC, IRRIGATION, BULB, CONTRO-BULB, PAPER POUCH

## (undated) DEVICE — GOWN, SURGICAL, SMARTGOWN, XLARGE, STERILE

## (undated) DEVICE — COVER, TABLE, 44 X 75 IN, DISPOSABLE, LF, STERILE

## (undated) DEVICE — TOWEL PACK, STERILE, 4/PACK, BLUE

## (undated) DEVICE — GLOVE, SURGICAL, PROTEXIS PI BLUE W/NEUTHERA, 7.5, PF, LF

## (undated) DEVICE — DRAIN, WOUND, ROUND, W/TROCAR, HOLE PATTERN, 10 IN, MEDIUM/LARGE, 3/16 X 49 IN

## (undated) DEVICE — DRESSING, GAUZE, FLUFF, 1 PLY, 18 X 36 IN

## (undated) DEVICE — SYRINGE, LUER LOCK, 12ML

## (undated) DEVICE — BAG, DECANTER

## (undated) DEVICE — DRAPE, TOWEL, STERI DRAPE, 17 X 11 IN, PLASTIC, STERILE

## (undated) DEVICE — GLOVE, SURGICAL, PROTEXIS PI , 7.0, PF, LF

## (undated) DEVICE — COUNTER, NEEDLE, FOAM BLOCK, POP-N-COUNT, W/BLADEGUARD, W/ADHESIVE 40 COUNT, RED

## (undated) DEVICE — CATHETER TRAY, URETHRAL, FOLEY, 2 WAY, GLOVES, PREP, LUBRICANT, DRAINAGE BAG, 16 FR, 5 CC, LF